# Patient Record
Sex: MALE | URBAN - METROPOLITAN AREA
[De-identification: names, ages, dates, MRNs, and addresses within clinical notes are randomized per-mention and may not be internally consistent; named-entity substitution may affect disease eponyms.]

---

## 2017-04-20 ENCOUNTER — HISTORICAL (OUTPATIENT)
Dept: ADMINISTRATIVE | Facility: HOSPITAL | Age: 67
End: 2017-04-20

## 2022-04-10 ENCOUNTER — HISTORICAL (OUTPATIENT)
Dept: ADMINISTRATIVE | Facility: HOSPITAL | Age: 72
End: 2022-04-10

## 2022-04-25 VITALS — HEIGHT: 70 IN | WEIGHT: 210 LBS | BODY MASS INDEX: 30.06 KG/M2

## 2025-03-15 ENCOUNTER — HOSPITAL ENCOUNTER (INPATIENT)
Facility: HOSPITAL | Age: 75
LOS: 2 days | Discharge: HOSPICE | DRG: 871 | End: 2025-03-17
Attending: STUDENT IN AN ORGANIZED HEALTH CARE EDUCATION/TRAINING PROGRAM | Admitting: INTERNAL MEDICINE
Payer: MEDICARE

## 2025-03-15 DIAGNOSIS — R05.9 COUGH: ICD-10-CM

## 2025-03-15 DIAGNOSIS — J18.9 PNEUMONIA OF RIGHT MIDDLE LOBE DUE TO INFECTIOUS ORGANISM: ICD-10-CM

## 2025-03-15 DIAGNOSIS — Z99.81 SUPPLEMENTAL OXYGEN DEPENDENT: ICD-10-CM

## 2025-03-15 DIAGNOSIS — Z78.9 ADMITTED TO INTENSIVE CARE UNIT: ICD-10-CM

## 2025-03-15 DIAGNOSIS — T68.XXXA HYPOTHERMIA, INITIAL ENCOUNTER: ICD-10-CM

## 2025-03-15 DIAGNOSIS — A41.9 SEPSIS, DUE TO UNSPECIFIED ORGANISM, UNSPECIFIED WHETHER ACUTE ORGAN DYSFUNCTION PRESENT: Primary | ICD-10-CM

## 2025-03-15 DIAGNOSIS — E16.2 HYPOGLYCEMIA: ICD-10-CM

## 2025-03-15 DIAGNOSIS — R41.82 AMS (ALTERED MENTAL STATUS): ICD-10-CM

## 2025-03-15 DIAGNOSIS — I95.9 HYPOTENSION: ICD-10-CM

## 2025-03-15 LAB
ACCEPTIBLE SP GR UR QL: 1.02 (ref 1–1.03)
ALBUMIN SERPL-MCNC: 2.9 G/DL (ref 3.4–4.8)
ALBUMIN/GLOB SERPL: 0.8 RATIO (ref 1.1–2)
ALP SERPL-CCNC: 147 UNIT/L (ref 40–150)
ALT SERPL-CCNC: 52 UNIT/L (ref 0–55)
AMPHET UR QL SCN: NEGATIVE
ANION GAP SERPL CALC-SCNC: 10 MEQ/L
APTT PPP: 34.3 SECONDS (ref 23.2–33.7)
AST SERPL-CCNC: 36 UNIT/L (ref 5–34)
BACTERIA #/AREA URNS AUTO: ABNORMAL /HPF
BARBITURATE SCN PRESENT UR: NEGATIVE
BENZODIAZ UR QL SCN: NEGATIVE
BILIRUB SERPL-MCNC: 0.4 MG/DL
BILIRUB UR QL STRIP.AUTO: NEGATIVE
BNP BLD-MCNC: 24.6 PG/ML
BUN SERPL-MCNC: 25.2 MG/DL (ref 8.4–25.7)
CALCIUM SERPL-MCNC: 10.1 MG/DL (ref 8.8–10)
CANNABINOIDS UR QL SCN: NEGATIVE
CHLORIDE SERPL-SCNC: 106 MMOL/L (ref 98–107)
CLARITY UR: CLEAR
CO2 SERPL-SCNC: 26 MMOL/L (ref 23–31)
COCAINE UR QL SCN: NEGATIVE
COLOR UR AUTO: ABNORMAL
CREAT SERPL-MCNC: 1.56 MG/DL (ref 0.72–1.25)
CREAT/UREA NIT SERPL: 16
ERYTHROCYTE [DISTWIDTH] IN BLOOD BY AUTOMATED COUNT: 16.6 % (ref 11.5–17)
ETHANOL SERPL-MCNC: <10 MG/DL
FENTANYL UR QL SCN: POSITIVE
FLUAV AG UPPER RESP QL IA.RAPID: NOT DETECTED
FLUBV AG UPPER RESP QL IA.RAPID: NOT DETECTED
GFR SERPLBLD CREATININE-BSD FMLA CKD-EPI: 46 ML/MIN/1.73/M2
GLOBULIN SER-MCNC: 3.6 GM/DL (ref 2.4–3.5)
GLUCOSE SERPL-MCNC: 76 MG/DL (ref 82–115)
GLUCOSE UR QL STRIP: NORMAL
HCT VFR BLD AUTO: 42.4 % (ref 42–52)
HGB BLD-MCNC: 14.7 G/DL (ref 14–18)
HGB UR QL STRIP: NEGATIVE
INR PPP: 1.1
KETONES UR QL STRIP: NEGATIVE
LACTATE SERPL-SCNC: 2 MMOL/L (ref 0.5–2.2)
LEUKOCYTE ESTERASE UR QL STRIP: NEGATIVE
MAGNESIUM SERPL-MCNC: 2 MG/DL (ref 1.6–2.6)
MCH RBC QN AUTO: 32.9 PG (ref 27–31)
MCHC RBC AUTO-ENTMCNC: 34.7 G/DL (ref 33–36)
MCV RBC AUTO: 94.9 FL (ref 80–94)
MDMA UR QL SCN: NEGATIVE
NITRITE UR QL STRIP: NEGATIVE
OPIATES UR QL SCN: NEGATIVE
PCP UR QL: NEGATIVE
PH UR STRIP: 5.5 [PH]
PH UR: 5.5 [PH] (ref 3–11)
PLATELET # BLD AUTO: 79 X10(3)/MCL (ref 130–400)
PLATELETS.RETICULATED NFR BLD AUTO: 17.3 % (ref 0.9–11.2)
PMV BLD AUTO: 13.4 FL (ref 7.4–10.4)
POCT GLUCOSE: 104 MG/DL (ref 70–110)
POCT GLUCOSE: 69 MG/DL (ref 70–110)
POCT GLUCOSE: 86 MG/DL (ref 70–110)
POTASSIUM SERPL-SCNC: 4.6 MMOL/L (ref 3.5–5.1)
PROT SERPL-MCNC: 6.5 GM/DL (ref 5.8–7.6)
PROT UR QL STRIP: ABNORMAL
PROTHROMBIN TIME: 13.8 SECONDS (ref 12.5–14.5)
RBC # BLD AUTO: 4.47 X10(6)/MCL (ref 4.7–6.1)
RBC #/AREA URNS AUTO: ABNORMAL /HPF
RSV A 5' UTR RNA NPH QL NAA+PROBE: NOT DETECTED
SARS-COV-2 RNA RESP QL NAA+PROBE: NOT DETECTED
SODIUM SERPL-SCNC: 142 MMOL/L (ref 136–145)
SP GR UR STRIP.AUTO: 1.02 (ref 1–1.03)
SQUAMOUS #/AREA URNS LPF: ABNORMAL /HPF
TROPONIN I SERPL-MCNC: <0.01 NG/ML (ref 0–0.04)
TSH SERPL-ACNC: 4.07 UIU/ML (ref 0.35–4.94)
UROBILINOGEN UR STRIP-ACNC: NORMAL
WBC # BLD AUTO: 15.68 X10(3)/MCL (ref 4.5–11.5)
WBC #/AREA URNS AUTO: ABNORMAL /HPF

## 2025-03-15 PROCEDURE — 87581 M.PNEUMON DNA AMP PROBE: CPT

## 2025-03-15 PROCEDURE — 85610 PROTHROMBIN TIME: CPT | Performed by: STUDENT IN AN ORGANIZED HEALTH CARE EDUCATION/TRAINING PROGRAM

## 2025-03-15 PROCEDURE — 85007 BL SMEAR W/DIFF WBC COUNT: CPT | Performed by: STUDENT IN AN ORGANIZED HEALTH CARE EDUCATION/TRAINING PROGRAM

## 2025-03-15 PROCEDURE — 81001 URINALYSIS AUTO W/SCOPE: CPT | Performed by: STUDENT IN AN ORGANIZED HEALTH CARE EDUCATION/TRAINING PROGRAM

## 2025-03-15 PROCEDURE — 96374 THER/PROPH/DIAG INJ IV PUSH: CPT | Mod: 59

## 2025-03-15 PROCEDURE — 87154 CUL TYP ID BLD PTHGN 6+ TRGT: CPT | Performed by: STUDENT IN AN ORGANIZED HEALTH CARE EDUCATION/TRAINING PROGRAM

## 2025-03-15 PROCEDURE — 80307 DRUG TEST PRSMV CHEM ANLYZR: CPT | Performed by: STUDENT IN AN ORGANIZED HEALTH CARE EDUCATION/TRAINING PROGRAM

## 2025-03-15 PROCEDURE — 11000001 HC ACUTE MED/SURG PRIVATE ROOM

## 2025-03-15 PROCEDURE — 85730 THROMBOPLASTIN TIME PARTIAL: CPT | Performed by: STUDENT IN AN ORGANIZED HEALTH CARE EDUCATION/TRAINING PROGRAM

## 2025-03-15 PROCEDURE — 5A0945A ASSISTANCE WITH RESPIRATORY VENTILATION, 24-96 CONSECUTIVE HOURS, HIGH NASAL FLOW/VELOCITY: ICD-10-PCS | Performed by: INTERNAL MEDICINE

## 2025-03-15 PROCEDURE — 93005 ELECTROCARDIOGRAM TRACING: CPT

## 2025-03-15 PROCEDURE — 84443 ASSAY THYROID STIM HORMONE: CPT | Performed by: STUDENT IN AN ORGANIZED HEALTH CARE EDUCATION/TRAINING PROGRAM

## 2025-03-15 PROCEDURE — 83880 ASSAY OF NATRIURETIC PEPTIDE: CPT | Performed by: STUDENT IN AN ORGANIZED HEALTH CARE EDUCATION/TRAINING PROGRAM

## 2025-03-15 PROCEDURE — 63600175 PHARM REV CODE 636 W HCPCS: Performed by: STUDENT IN AN ORGANIZED HEALTH CARE EDUCATION/TRAINING PROGRAM

## 2025-03-15 PROCEDURE — 99291 CRITICAL CARE FIRST HOUR: CPT

## 2025-03-15 PROCEDURE — 83735 ASSAY OF MAGNESIUM: CPT | Performed by: STUDENT IN AN ORGANIZED HEALTH CARE EDUCATION/TRAINING PROGRAM

## 2025-03-15 PROCEDURE — 25000003 PHARM REV CODE 250

## 2025-03-15 PROCEDURE — 85025 COMPLETE CBC W/AUTO DIFF WBC: CPT | Performed by: STUDENT IN AN ORGANIZED HEALTH CARE EDUCATION/TRAINING PROGRAM

## 2025-03-15 PROCEDURE — 80053 COMPREHEN METABOLIC PANEL: CPT | Performed by: STUDENT IN AN ORGANIZED HEALTH CARE EDUCATION/TRAINING PROGRAM

## 2025-03-15 PROCEDURE — 87186 SC STD MICRODIL/AGAR DIL: CPT

## 2025-03-15 PROCEDURE — 93010 ELECTROCARDIOGRAM REPORT: CPT | Mod: ,,, | Performed by: INTERNAL MEDICINE

## 2025-03-15 PROCEDURE — 83605 ASSAY OF LACTIC ACID: CPT | Performed by: STUDENT IN AN ORGANIZED HEALTH CARE EDUCATION/TRAINING PROGRAM

## 2025-03-15 PROCEDURE — 25000003 PHARM REV CODE 250: Performed by: STUDENT IN AN ORGANIZED HEALTH CARE EDUCATION/TRAINING PROGRAM

## 2025-03-15 PROCEDURE — 84484 ASSAY OF TROPONIN QUANT: CPT | Performed by: STUDENT IN AN ORGANIZED HEALTH CARE EDUCATION/TRAINING PROGRAM

## 2025-03-15 PROCEDURE — 0241U COVID/RSV/FLU A&B PCR: CPT | Performed by: STUDENT IN AN ORGANIZED HEALTH CARE EDUCATION/TRAINING PROGRAM

## 2025-03-15 PROCEDURE — 82962 GLUCOSE BLOOD TEST: CPT

## 2025-03-15 PROCEDURE — 87040 BLOOD CULTURE FOR BACTERIA: CPT | Performed by: STUDENT IN AN ORGANIZED HEALTH CARE EDUCATION/TRAINING PROGRAM

## 2025-03-15 PROCEDURE — 96365 THER/PROPH/DIAG IV INF INIT: CPT

## 2025-03-15 PROCEDURE — 27000207 HC ISOLATION

## 2025-03-15 PROCEDURE — 82077 ASSAY SPEC XCP UR&BREATH IA: CPT | Performed by: STUDENT IN AN ORGANIZED HEALTH CARE EDUCATION/TRAINING PROGRAM

## 2025-03-15 RX ORDER — SODIUM CHLORIDE 0.9 % (FLUSH) 0.9 %
10 SYRINGE (ML) INJECTION
Status: DISCONTINUED | OUTPATIENT
Start: 2025-03-16 | End: 2025-03-17 | Stop reason: HOSPADM

## 2025-03-15 RX ORDER — SODIUM CHLORIDE, SODIUM LACTATE, POTASSIUM CHLORIDE, CALCIUM CHLORIDE 600; 310; 30; 20 MG/100ML; MG/100ML; MG/100ML; MG/100ML
INJECTION, SOLUTION INTRAVENOUS CONTINUOUS
Status: DISCONTINUED | OUTPATIENT
Start: 2025-03-16 | End: 2025-03-17 | Stop reason: HOSPADM

## 2025-03-15 RX ORDER — NOREPINEPHRINE BITARTRATE/D5W 8 MG/250ML
0-3 PLASTIC BAG, INJECTION (ML) INTRAVENOUS CONTINUOUS
Status: DISCONTINUED | OUTPATIENT
Start: 2025-03-15 | End: 2025-03-16 | Stop reason: HOSPADM

## 2025-03-15 RX ORDER — CEFTRIAXONE 1 G/1
1 INJECTION, POWDER, FOR SOLUTION INTRAMUSCULAR; INTRAVENOUS
Status: DISCONTINUED | OUTPATIENT
Start: 2025-03-15 | End: 2025-03-15

## 2025-03-15 RX ORDER — ENOXAPARIN SODIUM 100 MG/ML
40 INJECTION SUBCUTANEOUS EVERY 24 HOURS
Status: DISCONTINUED | OUTPATIENT
Start: 2025-03-16 | End: 2025-03-17 | Stop reason: HOSPADM

## 2025-03-15 RX ORDER — NOREPINEPHRINE BITARTRATE/D5W 8 MG/250ML
PLASTIC BAG, INJECTION (ML) INTRAVENOUS
Status: COMPLETED
Start: 2025-03-15 | End: 2025-03-15

## 2025-03-15 RX ADMIN — SODIUM CHLORIDE 2052 ML: 9 INJECTION, SOLUTION INTRAVENOUS at 09:03

## 2025-03-15 RX ADMIN — Medication 0.02 MCG/KG/MIN: at 10:03

## 2025-03-15 RX ADMIN — AZITHROMYCIN MONOHYDRATE 500 MG: 500 INJECTION, POWDER, LYOPHILIZED, FOR SOLUTION INTRAVENOUS at 09:03

## 2025-03-15 RX ADMIN — NOREPINEPHRINE BITARTRATE 0.02 MCG/KG/MIN: 8 INJECTION, SOLUTION INTRAVENOUS at 10:03

## 2025-03-15 RX ADMIN — CEFTRIAXONE SODIUM 1 G: 1 INJECTION, POWDER, FOR SOLUTION INTRAMUSCULAR; INTRAVENOUS at 09:03

## 2025-03-15 NOTE — Clinical Note
Diagnosis: Sepsis, due to unspecified organism, unspecified whether acute organ dysfunction present [[id number]]   Future Attending Provider: FABRIZIO DELGADO [[id number]]   Admit to which facility:: [facility] [[id number]]   Reason for IP Medical Treatment  (Clinical interventions that can only be accomplished in the IP setting? ) :: PNA. IV ABX, Pressors

## 2025-03-16 LAB
ABS NEUT (OLG): 10.82 X10(3)/MCL (ref 2.1–9.2)
ALBUMIN SERPL-MCNC: 2.4 G/DL (ref 3.4–4.8)
ALBUMIN/GLOB SERPL: 0.6 RATIO (ref 1.1–2)
ALLENS TEST BLOOD GAS (OHS): YES
ALP SERPL-CCNC: 121 UNIT/L (ref 40–150)
ALT SERPL-CCNC: 42 UNIT/L (ref 0–55)
ANION GAP SERPL CALC-SCNC: 8 MEQ/L
AST SERPL-CCNC: 27 UNIT/L (ref 5–34)
B PERT.PT PRMT NPH QL NAA+NON-PROBE: NOT DETECTED
BASE EXCESS BLD CALC-SCNC: 2.1 MMOL/L
BILIRUB SERPL-MCNC: <0.5 MG/DL
BLOOD GAS SAMPLE TYPE (OHS): ABNORMAL
BUN SERPL-MCNC: 22.8 MG/DL (ref 8.4–25.7)
BURR CELLS (OLG): ABNORMAL
C PNEUM DNA NPH QL NAA+NON-PROBE: NOT DETECTED
CA-I BLD-SCNC: 1.29 MMOL/L (ref 1.12–1.23)
CALCIUM SERPL-MCNC: 9.6 MG/DL (ref 8.8–10)
CHLORIDE SERPL-SCNC: 110 MMOL/L (ref 98–107)
CO2 BLDA-SCNC: 28.7 MMOL/L
CO2 SERPL-SCNC: 21 MMOL/L (ref 23–31)
CREAT SERPL-MCNC: 1.51 MG/DL (ref 0.72–1.25)
CREAT/UREA NIT SERPL: 15
DRAWN BY BLOOD GAS (OHS): ABNORMAL
GFR SERPLBLD CREATININE-BSD FMLA CKD-EPI: 48 ML/MIN/1.73/M2
GLOBULIN SER-MCNC: 3.7 GM/DL (ref 2.4–3.5)
GLUCOSE SERPL-MCNC: 85 MG/DL (ref 82–115)
HADV DNA NPH QL NAA+NON-PROBE: NOT DETECTED
HCO3 BLDA-SCNC: 27.3 MMOL/L (ref 22–26)
HCOV 229E RNA NPH QL NAA+NON-PROBE: NOT DETECTED
HCOV HKU1 RNA NPH QL NAA+NON-PROBE: NOT DETECTED
HCOV NL63 RNA NPH QL NAA+NON-PROBE: NOT DETECTED
HCOV OC43 RNA NPH QL NAA+NON-PROBE: NOT DETECTED
HMPV RNA NPH QL NAA+NON-PROBE: NOT DETECTED
HPIV1 RNA NPH QL NAA+NON-PROBE: NOT DETECTED
HPIV2 RNA NPH QL NAA+NON-PROBE: NOT DETECTED
HPIV3 RNA NPH QL NAA+NON-PROBE: NOT DETECTED
HPIV4 RNA NPH QL NAA+NON-PROBE: NOT DETECTED
INSTRUMENT WBC (OLG): 15.68 X10(3)/MCL
LPM (OHS): 4
LYMPHOCYTES NFR BLD MANUAL: 19 %
LYMPHOCYTES NFR BLD MANUAL: 2.98 X10(3)/MCL (ref 0.6–4.6)
M PNEUMO DNA NPH QL NAA+NON-PROBE: NOT DETECTED
MACROCYTES BLD QL SMEAR: ABNORMAL
MAGNESIUM SERPL-MCNC: 1.7 MG/DL (ref 1.6–2.6)
METAMYELOCYTES NFR BLD MANUAL: 5 %
MONOCYTES NFR BLD MANUAL: 0.78 X10(3)/MCL (ref 0.1–1.3)
MONOCYTES NFR BLD MANUAL: 5 %
MYELOCYTES NFR BLD MANUAL: 3 %
NEUTROPHILS NFR BLD MANUAL: 69 %
OHS QRS DURATION: 88 MS
OHS QTC CALCULATION: 449 MS
OXYGEN DEVICE BLOOD GAS (OHS): ABNORMAL
PCO2 BLDA: 44 MMHG (ref 35–45)
PH BLDA: 7.4 [PH] (ref 7.35–7.45)
PHOSPHATE SERPL-MCNC: 2.2 MG/DL (ref 2.3–4.7)
PLATELET # BLD EST: ABNORMAL 10*3/UL
PO2 BLDA: 68 MMHG (ref 80–100)
POCT GLUCOSE: 82 MG/DL (ref 70–110)
POIKILOCYTOSIS BLD QL SMEAR: ABNORMAL
POTASSIUM BLOOD GAS (OHS): 4 MMOL/L (ref 3.5–5)
POTASSIUM SERPL-SCNC: 4.3 MMOL/L (ref 3.5–5.1)
PROT SERPL-MCNC: 6.1 GM/DL (ref 5.8–7.6)
RBC MORPH BLD: ABNORMAL
RSV RNA NPH QL NAA+NON-PROBE: NOT DETECTED
RV+EV RNA NPH QL NAA+NON-PROBE: NOT DETECTED
SAMPLE SITE BLOOD GAS (OHS): ABNORMAL
SAO2 % BLDA: 93 %
SODIUM BLOOD GAS (OHS): 137 MMOL/L (ref 137–145)
SODIUM SERPL-SCNC: 139 MMOL/L (ref 136–145)
TARGETS BLD QL SMEAR: ABNORMAL

## 2025-03-16 PROCEDURE — 27000221 HC OXYGEN, UP TO 24 HOURS

## 2025-03-16 PROCEDURE — 25000003 PHARM REV CODE 250

## 2025-03-16 PROCEDURE — 63600175 PHARM REV CODE 636 W HCPCS: Performed by: INTERNAL MEDICINE

## 2025-03-16 PROCEDURE — 99900031 HC PATIENT EDUCATION (STAT)

## 2025-03-16 PROCEDURE — 99900026 HC AIRWAY MAINTENANCE (STAT)

## 2025-03-16 PROCEDURE — 99900035 HC TECH TIME PER 15 MIN (STAT)

## 2025-03-16 PROCEDURE — 36415 COLL VENOUS BLD VENIPUNCTURE: CPT | Performed by: STUDENT IN AN ORGANIZED HEALTH CARE EDUCATION/TRAINING PROGRAM

## 2025-03-16 PROCEDURE — 63600175 PHARM REV CODE 636 W HCPCS

## 2025-03-16 PROCEDURE — 25000242 PHARM REV CODE 250 ALT 637 W/ HCPCS

## 2025-03-16 PROCEDURE — 31720 CLEARANCE OF AIRWAYS: CPT

## 2025-03-16 PROCEDURE — 80053 COMPREHEN METABOLIC PANEL: CPT

## 2025-03-16 PROCEDURE — 94640 AIRWAY INHALATION TREATMENT: CPT

## 2025-03-16 PROCEDURE — 25000003 PHARM REV CODE 250: Performed by: INTERNAL MEDICINE

## 2025-03-16 PROCEDURE — 83735 ASSAY OF MAGNESIUM: CPT

## 2025-03-16 PROCEDURE — 94760 N-INVAS EAR/PLS OXIMETRY 1: CPT

## 2025-03-16 PROCEDURE — 93005 ELECTROCARDIOGRAM TRACING: CPT

## 2025-03-16 PROCEDURE — 84100 ASSAY OF PHOSPHORUS: CPT

## 2025-03-16 PROCEDURE — 36415 COLL VENOUS BLD VENIPUNCTURE: CPT

## 2025-03-16 PROCEDURE — 21400001 HC TELEMETRY ROOM

## 2025-03-16 PROCEDURE — 27000207 HC ISOLATION

## 2025-03-16 PROCEDURE — 87040 BLOOD CULTURE FOR BACTERIA: CPT | Performed by: STUDENT IN AN ORGANIZED HEALTH CARE EDUCATION/TRAINING PROGRAM

## 2025-03-16 RX ORDER — MORPHINE SULFATE 4 MG/ML
1 INJECTION, SOLUTION INTRAMUSCULAR; INTRAVENOUS EVERY 4 HOURS PRN
Status: DISCONTINUED | OUTPATIENT
Start: 2025-03-16 | End: 2025-03-17 | Stop reason: HOSPADM

## 2025-03-16 RX ORDER — IPRATROPIUM BROMIDE AND ALBUTEROL SULFATE 2.5; .5 MG/3ML; MG/3ML
3 SOLUTION RESPIRATORY (INHALATION) EVERY 6 HOURS PRN
Status: DISCONTINUED | OUTPATIENT
Start: 2025-03-16 | End: 2025-03-17 | Stop reason: HOSPADM

## 2025-03-16 RX ORDER — ACETYLCYSTEINE 100 MG/ML
4 SOLUTION ORAL; RESPIRATORY (INHALATION)
Status: DISCONTINUED | OUTPATIENT
Start: 2025-03-16 | End: 2025-03-17 | Stop reason: HOSPADM

## 2025-03-16 RX ORDER — SCOPOLAMINE 1 MG/3D
1 PATCH, EXTENDED RELEASE TRANSDERMAL
Status: DISCONTINUED | OUTPATIENT
Start: 2025-03-16 | End: 2025-03-17 | Stop reason: HOSPADM

## 2025-03-16 RX ORDER — LORAZEPAM 2 MG/ML
1 INJECTION INTRAMUSCULAR
Status: DISCONTINUED | OUTPATIENT
Start: 2025-03-16 | End: 2025-03-16

## 2025-03-16 RX ORDER — MUPIROCIN 20 MG/G
OINTMENT TOPICAL 2 TIMES DAILY
Status: DISCONTINUED | OUTPATIENT
Start: 2025-03-16 | End: 2025-03-17 | Stop reason: HOSPADM

## 2025-03-16 RX ADMIN — PIPERACILLIN SODIUM AND TAZOBACTAM SODIUM 4.5 G: 4; .5 INJECTION, POWDER, LYOPHILIZED, FOR SOLUTION INTRAVENOUS at 02:03

## 2025-03-16 RX ADMIN — MUPIROCIN: 20 OINTMENT TOPICAL at 09:03

## 2025-03-16 RX ADMIN — IPRATROPIUM BROMIDE AND ALBUTEROL SULFATE 3 ML: .5; 3 SOLUTION RESPIRATORY (INHALATION) at 01:03

## 2025-03-16 RX ADMIN — ENOXAPARIN SODIUM 40 MG: 40 INJECTION SUBCUTANEOUS at 05:03

## 2025-03-16 RX ADMIN — ACETYLCYSTEINE 4 ML: 100 INHALANT RESPIRATORY (INHALATION) at 02:03

## 2025-03-16 RX ADMIN — VANCOMYCIN HYDROCHLORIDE 1500 MG: 1.5 INJECTION, POWDER, LYOPHILIZED, FOR SOLUTION INTRAVENOUS at 12:03

## 2025-03-16 RX ADMIN — PIPERACILLIN SODIUM AND TAZOBACTAM SODIUM 4.5 G: 4; .5 INJECTION, POWDER, LYOPHILIZED, FOR SOLUTION INTRAVENOUS at 10:03

## 2025-03-16 RX ADMIN — SCOPOLAMINE 1 PATCH: 1 PATCH TRANSDERMAL at 10:03

## 2025-03-16 RX ADMIN — IPRATROPIUM BROMIDE AND ALBUTEROL SULFATE 3 ML: .5; 3 SOLUTION RESPIRATORY (INHALATION) at 08:03

## 2025-03-16 RX ADMIN — SODIUM CHLORIDE, POTASSIUM CHLORIDE, SODIUM LACTATE AND CALCIUM CHLORIDE: 600; 310; 30; 20 INJECTION, SOLUTION INTRAVENOUS at 12:03

## 2025-03-16 RX ADMIN — AZITHROMYCIN MONOHYDRATE 500 MG: 500 INJECTION, POWDER, LYOPHILIZED, FOR SOLUTION INTRAVENOUS at 10:03

## 2025-03-16 RX ADMIN — MUPIROCIN: 20 OINTMENT TOPICAL at 10:03

## 2025-03-16 RX ADMIN — PIPERACILLIN SODIUM AND TAZOBACTAM SODIUM 4.5 G: 4; .5 INJECTION, POWDER, LYOPHILIZED, FOR SOLUTION INTRAVENOUS at 05:03

## 2025-03-16 RX ADMIN — SODIUM CHLORIDE, POTASSIUM CHLORIDE, SODIUM LACTATE AND CALCIUM CHLORIDE: 600; 310; 30; 20 INJECTION, SOLUTION INTRAVENOUS at 05:03

## 2025-03-16 RX ADMIN — ACETYLCYSTEINE 4 ML: 100 INHALANT RESPIRATORY (INHALATION) at 08:03

## 2025-03-16 RX ADMIN — VANCOMYCIN HYDROCHLORIDE 1250 MG: 1.25 INJECTION, POWDER, LYOPHILIZED, FOR SOLUTION INTRAVENOUS at 02:03

## 2025-03-16 NOTE — PLAN OF CARE
Problem: Infection  Goal: Absence of Infection Signs and Symptoms  Outcome: Progressing     Problem: Adult Inpatient Plan of Care  Goal: Plan of Care Review  Outcome: Progressing  Goal: Patient-Specific Goal (Individualized)  Outcome: Progressing  Goal: Absence of Hospital-Acquired Illness or Injury  Outcome: Progressing  Goal: Optimal Comfort and Wellbeing  Outcome: Progressing  Goal: Readiness for Transition of Care  Outcome: Progressing     Problem: Neutropenia  Goal: Absence of Infection  Outcome: Progressing     Problem: Skin Injury Risk Increased  Goal: Skin Health and Integrity  Outcome: Progressing      fluid retention

## 2025-03-16 NOTE — ED PROVIDER NOTES
Encounter Date: 3/15/2025    SCRIBE #1 NOTE: I, Sam Godinez, am scribing for, and in the presence of,  Bon Negrete MD. I have scribed the following portions of the note - Other sections scribed: HPI,ROS,PE.       History     Chief Complaint   Patient presents with    Altered Mental Status     Pt arrives via [facility], EMS reports family called due to altered mental status x several days, family noticed that he wasn't talking earlier tonight, Pt is speaking on arrival, slow to respond. No facial asymmetry, no unilateral weakness, weakness in all 4 ext. EMS reports CBG 64 they gave 10 g of glucose, CBG on arrival 104. Pt has wet sounding cough. Pt is a DNR.      73 y/o male with PMHx of dementia, HTN, and alcohol dependence presents to ED via EMS for altered mental status onset ~3x days. Wife at bedside reports pt has been having worsening altered mental status since onset. She reports pt isn't acting normally, she states he is not eating/drinking, not as active, and isn't talking like normal.  She also states pt has been having a wet cough for several days. She states she is currently trying to set the patient up for hospice, but keeps getting delayed.     History and ROS limited due to pt's history of dementia.     The history is provided by the spouse. History limited by: pt's history of dementia.     Review of patient's allergies indicates:  Not on File  No past medical history on file.  No past surgical history on file.  No family history on file.  Social History[1]  Review of Systems   Unable to perform ROS: Dementia       Physical Exam     Initial Vitals [03/15/25 2022]   BP Pulse Resp Temp SpO2   (!) 113/58 64 16 (S) (!) 95.6 °F (35.3 °C) 96 %      MAP       --         Physical Exam    Constitutional: He appears well-developed and well-nourished. He is not diaphoretic. No distress.   HENT:   Head: Normocephalic and atraumatic.   Right Ear: External ear normal.   Left Ear: External ear normal.   Nose: Nose  normal.   Eyes: EOM are normal. Pupils are equal, round, and reactive to light. Right eye exhibits no discharge. Left eye exhibits no discharge.   Cardiovascular:  Normal rate, regular rhythm and normal heart sounds.     Exam reveals no gallop and no friction rub.       No murmur heard.  Pulses:       Radial pulses are 2+ on the right side and 2+ on the left side.   Pulmonary/Chest: Effort normal. No respiratory distress. He has no wheezes. He has rhonchi (rhonchi to the bilateral lower lobes). He has rales (rales on the right). He exhibits no tenderness.   Wet cough.    Abdominal: Abdomen is soft. Bowel sounds are normal. He exhibits no distension and no mass. There is no abdominal tenderness. There is no rebound and no guarding.   Musculoskeletal:         General: No edema. Normal range of motion.     Neurological: He is alert. No cranial nerve deficit or sensory deficit.   Pt is confused.   Intermittently follows commands.   Delayed to respond.    Skin: Skin is warm and dry. Capillary refill takes less than 2 seconds.         ED Course   Critical Care    Date/Time: 3/15/2025 8:34 PM    Performed by: Bon Negrete MD  Authorized by: Bon Negrete MD  Direct patient critical care time: 9 minutes  Additional history critical care time: 7 minutes  Ordering / reviewing critical care time: 8 minutes  Documentation critical care time: 5 minutes  Consulting other physicians critical care time: 6 minutes  Total critical care time (exclusive of procedural time) : 35 minutes  Critical care time was exclusive of separately billable procedures and treating other patients.  Critical care was necessary to treat or prevent imminent or life-threatening deterioration of the following conditions: sepsis.  Critical care was time spent personally by me on the following activities: development of treatment plan with patient or surrogate, discussions with consultants, interpretation of cardiac output measurements, evaluation  of patient's response to treatment, obtaining history from patient or surrogate, examination of patient, ordering and performing treatments and interventions, ordering and review of laboratory studies, ordering and review of radiographic studies, pulse oximetry and re-evaluation of patient's condition.        Labs Reviewed   APTT - Abnormal       Result Value    PTT 34.3 (*)    COMPREHENSIVE METABOLIC PANEL - Abnormal    Sodium 142      Potassium 4.6      Chloride 106      CO2 26      Glucose 76 (*)     Blood Urea Nitrogen 25.2      Creatinine 1.56 (*)     Calcium 10.1 (*)     Protein Total 6.5      Albumin 2.9 (*)     Globulin 3.6 (*)     Albumin/Globulin Ratio 0.8 (*)     Bilirubin Total 0.4            ALT 52      AST 36 (*)     eGFR 46      Anion Gap 10.0      BUN/Creatinine Ratio 16     DRUG SCREEN, URINE (BEAKER) - Abnormal    Amphetamines, Urine Negative      Barbiturates, Urine Negative      Benzodiazepine, Urine Negative      Cannabinoids, Urine Negative      Cocaine, Urine Negative      Fentanyl, Urine Positive (*)     MDMA, Urine Negative      Opiates, Urine Negative      Phencyclidine, Urine Negative      pH, Urine 5.5      Specific Gravity, Urine Auto 1.020      Narrative:     Cut off concentrations:    Amphetamines - 1000 ng/ml  Barbiturates - 200 ng/ml  Benzodiazepine - 200 ng/ml  Cannabinoids (THC) - 50 ng/ml  Cocaine - 300 ng/ml  Fentanyl - 1.0 ng/ml  MDMA - 500 ng/ml  Opiates - 300 ng/ml   Phencyclidine (PCP) - 25 ng/ml    Specimen submitted for drug analysis and tested for pH and specific gravity in order to evaluate sample integrity. Suspect tampering if specific gravity is <1.003 and/or pH is not within the range of 4.5 - 8.0  False negatives may result form substances such as bleach added to urine.  False positives may result for the presence of a substance with similar chemical structure to the drug or its metabolite.    This test provides only a PRELIMINARY analytical test result. A  more specific alternate chemical method must be used in order to obtain a confirmed analytical result. Gas chromatography/mass spectrometry (GC/MS) is the preferred confirmatory method. Other chemical confirmation methods are available. Clinical consideration and professional judgement should be applied to any drug of abuse test result, particularly when preliminary positive results are used.    Positive results will be confirmed only at the physicians request. Unconfirmed screening results are to be used only for medical purposes (treatment).        URINALYSIS, REFLEX TO URINE CULTURE - Abnormal    Color, UA Light-Yellow      Appearance, UA Clear      Specific Gravity, UA 1.020      pH, UA 5.5      Protein, UA Trace (*)     Glucose, UA Normal      Ketones, UA Negative      Blood, UA Negative      Bilirubin, UA Negative      Urobilinogen, UA Normal      Nitrites, UA Negative      Leukocyte Esterase, UA Negative      RBC, UA 0-5      WBC, UA None Seen      Bacteria, UA None Seen      Squamous Epithelial Cells, UA None Seen     CBC WITH DIFFERENTIAL - Abnormal    WBC 15.68 (*)     RBC 4.47 (*)     Hgb 14.7      Hct 42.4      MCV 94.9 (*)     MCH 32.9 (*)     MCHC 34.7      RDW 16.6      Platelet 79 (*)     MPV 13.4 (*)     IPF 17.3 (*)    POCT GLUCOSE - Abnormal    POCT Glucose 69 (*)    B-TYPE NATRIURETIC PEPTIDE - Normal    Natriuretic Peptide 24.6     COVID/RSV/FLU A&B PCR - Normal    Influenza A PCR Not Detected      Influenza B PCR Not Detected      Respiratory Syncytial Virus PCR Not Detected      SARS-CoV-2 PCR Not Detected      Narrative:     The Xpert Xpress SARS-CoV-2/FLU/RSV plus is a rapid, multiplexed real-time PCR test intended for the simultaneous qualitative detection and differentiation of SARS-CoV-2, Influenza A, Influenza B, and respiratory syncytial virus (RSV) viral RNA in either nasopharyngeal swab or nasal swab specimens.         ALCOHOL,MEDICAL (ETHANOL) - Normal    Ethanol Level <10.0      LACTIC ACID, PLASMA - Normal    Lactic Acid Level 2.0     MAGNESIUM - Normal    Magnesium Level 2.00     PROTIME-INR - Normal    PT 13.8      INR 1.1      Narrative:     Protimes are used to monitor anticoagulant agents such as warfarin. PT INR values are based on the current patient normal mean and the TAJ value for the specific instrument reagent used.  **Routine theraputic target values for the INR are 2.0-3.0**   TROPONIN I - Normal    Troponin-I <0.010     TSH - Normal    TSH 4.066     BLOOD CULTURE [facility]   BLOOD CULTURE [facility]   RESPIRATORY CULTURE ([facility])   DRUG SCREEN, URINE (BEAKER)   MRSA PCR   POCT GLUCOSE    POCT Glucose 104     POCT GLUCOSE    POCT Glucose 86     POCT GLUCOSE MONITORING CONTINUOUS          Imaging Results              CT Chest Without Contrast (Preliminary result)  Result time 03/16/25 00:29:18      Preliminary result by Aleks Busch MD (03/16/25 00:29:18)                   Narrative:    START OF REPORT:  Technique: CT Scan of the chest was performed without intravenous contrast with direct axial as well as sagittal and, coronal, reconstruction images.    Dosage Information: Automated Exposure Control was utilized.    Comparison: None.    Clinical History: Pneumonia, unresolved; Aspiration.    Findings:  Neck: The visualized soft tissues of the neck appear unremarkable.  Mediastinum: The mediastinal structures are within normal limits.  Heart: The heart size is within normal limits. Minimal coronary artery calcification is seen.  Aorta: Moderate aortic calcification is seen in the thoracic aorta.  Pulmonary Arteries: No filling defects are seen in the pulmonary arteries to suggest pulmonary embolus to the sensitivity of the study.  Lungs: There are hazy and nodular opacities seen in both lower lobes. Focal consolidations are also seen in both lung bases. These are consistent with an acute infectious process such as multifocal atypical pneumonia.  Pleura: No effusions or pneumothorax are  identified.  Bony Structures:  Spine: Moderate spondylolytic changes are seen in the thoracic spine. Multilevel disc space narrowing is also noted.  Abdomen: Layering sludge is seen within the gallbladder. A cyst is noted in the upper pole of the left kidney measuring 2.5 cm.      Impression:  1. There are hazy and nodular opacities seen in both lower lobes. Focal consolidations are also seen in both lung bases. These are consistent with an acute infectious process such as multifocal atypical pneumonia. Correlate with clinical and laboratory findings as regards additional evaluation and follow-up.  2. Details and other findings as discussed above.                                         CT Head Without Contrast (Preliminary result)  Result time 03/15/25 21:26:50      Preliminary result by Jesus Oneill MD (03/15/25 21:26:50)                   Narrative:    START OF REPORT:  Technique: CT of the head was performed without intravenous contrast with axial as well as coronal and sagittal images.    Comparison: None.    Dosage Information: Automated exposure control was utilized.    Clinical history: Ams, weakness.    Findings:  Hemorrhage: No acute intracranial hemorrhage is seen.  CSF spaces: The ventricles, sulci and basal cisterns all appear mildly prominent consistent with global cerebral atrophy.  Brain parenchyma: Mild microvascular change is seen in portions of the periventricular and deep white matter tracts.  Cerebellum: Unremarkable.  Sella and skull base: The sella appears somewhat prominent and CSF filled. This could reflect primary sella. Correlate with clinical and laboratory findings as regards additional evaluation and follow up.  Intracranial calcifications: Incidental note is made of bilateral choroid plexus calcification. Incidental note is made of some pineal region calcification. Incidental note is made of some calcification of the falx.  Calvarium: No acute linear or depressed skull fracture is  seen.    Maxillofacial Structures:  Paranasal sinuses: There is some opacity in the right maxillary sinus right ethmoid air cells and bilateral frontal sinuses. This may reflect acute on chronic sinus disease. The rest of the paranasal sinuses appear clear.  Orbits: The orbits appear unremarkable.  Zygomatic arches: The zygomatic arches are intact and unremarkable.  Temporal bones and mastoids: The temporal bones and mastoids appear unremarkable.  TMJ: The mandibular condyles appear normally placed with respect to the mandibular fossa.      Impression:  1. There is some opacity in the right maxillary sinus right ethmoid air cells and bilateral frontal sinuses. This may reflect acute on chronic sinus disease. Correlate clinically.  2. No acute intracranial process identified. Details and findings as noted above.                                         X-Ray Chest AP Portable (In process)                      Medications   azithromycin (ZITHROMAX) 500 mg in 0.9% NaCl 250 mL IVPB (admixture device) (0 mg Intravenous Stopped 3/15/25 2245)   NORepinephrine 8 mg in dextrose 5% 250 mL infusion (0.08 mcg/kg/min × 77.1 kg Intravenous Verify Only 3/16/25 0137)   sodium chloride 0.9% flush 10 mL (has no administration in time range)   enoxaparin injection 40 mg (has no administration in time range)   vancomycin - pharmacy to dose (has no administration in time range)   piperacillin-tazobactam (ZOSYN) 4.5 g in D5W 100 mL IVPB (MB+) (4.5 g Intravenous New Bag 3/16/25 0209)   lactated ringers infusion ( Intravenous Verify Only 3/16/25 0137)   mupirocin 2 % ointment (has no administration in time range)   acetylcysteine 100 mg/ml (10%) solution 4 mL (has no administration in time range)   albuterol-ipratropium 2.5 mg-0.5 mg/3 mL nebulizer solution 3 mL (3 mLs Nebulization Given 3/16/25 0107)   sodium chloride 0.9% bolus 2,052 mL 2,052 mL (0 mLs Intravenous Stopped 3/15/25 2245)   vancomycin 1,500 mg in 0.9% NaCl 250 mL IVPB  "(admixture device) (0 mg Intravenous Stopped 3/16/25 0216)             Scribe Attestation:   Scribe #1: I performed the above scribed service and the documentation accurately describes the services I performed. I attest to the accuracy of the note.    Attending Attestation:           Physician Attestation for Scribe:  Physician Attestation Statement for Scribe #1: I, Bon Negrete MD, reviewed documentation, as scribed by Sam Godinez in my presence, and it is both accurate and complete.         Medical Decision Making  The differential diagnosis includes, but is not limited to, sepsis, UTI, pneumonia, worsening Alzheimer's     See ED course for MDM    Amount and/or Complexity of Data Reviewed  Independent Historian: spouse     Details: Wife at bedside reports pt has been having worsening altered mental status since onset. She reports pt isn't acting normally, she states he is not eating/drinking, not as active, and isn't talking like normal.  She also states pt has been having a wet cough for several days. She states she is currently trying to set the patient up for hospice, but keeps getting delayed.     Labs: ordered. Decision-making details documented in ED Course.  Radiology: ordered and independent interpretation performed. Decision-making details documented in ED Course.  ECG/medicine tests: ordered and independent interpretation performed. Decision-making details documented in ED Course.    Risk  Prescription drug management.  Decision regarding hospitalization.            ED Course as of 03/16/25 0314   Sat Mar 15, 2025   2032 Chart review reveals note from PCP 01/16/2025 history of hypertension dementia.  That has on Alzheimer's medication but unsure of name.  Evaluated by hospitalist in the past for "going downhill".  History of dementia, peripheral artery disease, alcohol dependence. [MM]   2033 POCT Glucose: 104 [MM]   2047 Discussed with wife and patient in room.  She reports in the past he wished " to be a DNR/DNI.  I did asked patient he confirmed this.  Albeit he does not appear to be somewhat confused but we will honor both him and his wife's wishes. [MM]   2054 EKG done at 8:51 p.m. shows sinus rhythm rate of 60 .  Normal axis.  No ST elevation or depression.  Wavering baseline somewhat limiting interpretation but I do believe this represents sinus rhythm at not atrial fibrillation. [MM]   2121 POCT Glucose: 86 [MM]   2121 X-Ray Chest AP Portable  Right perihilar infiltrates, infiltrates bilateral lower lobes [MM]   2128 CBC auto differential(!)  Leukocytosis, no anemia [MM]   2128 Clinically patient appears to have pneumonia, he has dropped his blood pressure since we have started rewarming this maybe due to his infection versus vasodilation from the Eli Hugger.  We will start antibiotics, sepsis fluid bolus. [MM]   2145 Magnesium : 2.00 [MM]   2145 Alcohol, Serum: <10.0 [MM]   2145 POCT Glucose: 86 [MM]   2145 Comprehensive metabolic panel(!)  When compared to labs from 2 years ago acute kidney injury.  Creatinine of 1.56 prior 1.  No significant electrolyte abnormalities. [MM]   2145 INR: 1.1 [MM]   2145 PTT(!): 34.3 [MM]   2248 Patient with a worsening glucose, blood pressure here in the emergency department.  We have started him on a D5 half-normal drip.  We have started him on Levophed due to worsening hypotension despite receiving 3 L of fluid here in the emergency department.  I do believe part of his hypotension is due to 3 warming which has caused some vasodilation.  Both clinically and on plain films he has pneumonia.  Started on antibiotics.  Will admit for further evaluation management have discussed with both wife, son.  They both state that patient would want to be a DNR.  We would not want CPR intubation.  This has been placed in the chart and I have passed this has onto the ICU/admitting team.  We will see about getting patient admitted to the ICU for further evaluation management  "with concern for sepsis. [MM]   2250 Re-evaluation patient does feel warmer to touch.  His blood pressure is still soft we are starting Levophed.  Hands are warm.  Still arousable but continues to be somewhat altered here. [MM]   2304 Patient has a patch on his left shoulder.  That has unlabeled, skin colored/tan in appearance.  That has has been removed.  Wife reports that has a clonidine patch. [MM]      ED Course User Index  [MM] Bon Negrete MD               Medical Decision Making:   Clinical Tests:   Sepsis Perfusion Assessment: "I attest a sepsis perfusion exam was performed within 6 hours of sepsis, severe sepsis, or septic shock presentation, following fluid resuscitation."             Clinical Impression:  Final diagnoses:  [R41.82] AMS (altered mental status)  [R05.9] Cough  [T68.XXXA] Hypothermia, initial encounter  [Z99.81] Supplemental oxygen dependent  [J18.9] Pneumonia of right middle lobe due to infectious organism  [A41.9] Sepsis, due to unspecified organism, unspecified whether acute organ dysfunction present (Primary)  [E16.2] Hypoglycemia          ED Disposition Condition    Admit Stable                    [1]         Bon Negrete MD  03/16/25 0314    "

## 2025-03-16 NOTE — PLAN OF CARE
Problem: Adult Inpatient Plan of Care  Goal: Plan of Care Review  Outcome: Progressing  Goal: Patient-Specific Goal (Individualized)  Outcome: Progressing  Goal: Absence of Hospital-Acquired Illness or Injury  Outcome: Progressing  Goal: Optimal Comfort and Wellbeing  Outcome: Progressing  Goal: Readiness for Transition of Care  Outcome: Progressing     Problem: Skin Injury Risk Increased  Goal: Skin Health and Integrity  Outcome: Progressing     Problem: Coping Ineffective  Goal: Effective Coping  Outcome: Progressing

## 2025-03-16 NOTE — H&P
[facility] - Emergency Dept  Pulmonary Critical Care Note    Patient Name: Wojciech Jaimes  MRN: [id number]  Admission Date: 3/15/2025  Hospital Length of Stay: 0 days  Code Status: DNR  Attending Provider: Cassy Corley MD  Primary Care Provider: No primary care provider on file.     Subjective:     HPI:   Wojciech Jaimes 74 y.o. male with pmhx of dementia, HRN, alcohol use disorder presented to ED via EMS for concerns of AMS per family for 3 days. Most of History obtained from wife and son present, history/ROS limited due to hx of dementia and AMS. Patient at baseline with waxing and waning confusion, reports worsening Ams for several days along with decreased activity, and oral intake. Productive cough for several days, denies sick contacts. Reports intermittent chills for several days at night.  Patient is currently DNR, in process of getting setup for hospice/palliative care. [facility]. Denies cardiac, respiratory, or renal disease history. Reports hx of back surgery, no other surgical hx.     In ED, patient was hypothermic on arrival with rectal temp of 93.5F, bradycardic 50s, elevated WBC 15.68 and hypotensive 79/54. Sepsis protocol started, patient received 30ml/kg(2L bolus of NS). Blood cultures x2, Chest x-ray with bilateral pneumonia, started Rocephin and Azithromycin. Lactic acid 2.0, Troponin <0.01, Covid/flu/rsv negative, UA noninfectious, UDS positive for fentanyl. CT head obtained for AMS with no acute intracranial process. ICU consulted for septic shock, PNA, and pressor requirement    Hospital Course/Significant events:  03/15/2025: admitted to ICU for septic shock    24 Hour Interval History:  See HPI above    No past medical history on file.    No past surgical history on file.    Social History[1]        No current outpatient medications    Review of patient's allergies indicates:  Not on File     Current Inpatient Medications   azithromycin  500 mg Intravenous  Q24H    [START ON 3/16/2025] enoxparin  40 mg Subcutaneous Daily    piperacillin-tazobactam (Zosyn) IV (PEDS and ADULTS) (extended infusion is not appropriate)  4.5 g Intravenous Q8H       Current Intravenous Infusions   NORepinephrine bitartrate-D5W  0-3 mcg/kg/min Intravenous Continuous 26 mL/hr at 03/15/25 2324 0.18 mcg/kg/min at 03/15/25 2324         Review of Systems   Reason unable to perform ROS: AMS and dementia.          Objective:       Intake/Output Summary (Last 24 hours) at 3/15/2025 2349  Last data filed at 3/15/2025 2200  Gross per 24 hour   Intake --   Output 350 ml   Net -350 ml         Vital Signs (Most Recent):  Temp: (S) (!) 93.5 °F (34.2 °C) (03/15/25 2052)  Pulse: 68 (03/15/25 2327)  Resp: 20 (03/15/25 2327)  BP: (!) 112/53 (03/15/25 2327)  SpO2: 97 % (03/15/25 2327)  Body mass index is 25.85 kg/m².  Weight: 77.1 kg (170 lb) Vital Signs (24h Range):  Temp:  [93.5 °F (34.2 °C)-95.6 °F (35.3 °C)] 93.5 °F (34.2 °C)  Pulse:  [51-68] 68  Resp:  [14-23] 20  SpO2:  [89 %-99 %] 97 %  BP: ()/(44-81) 112/53     Physical Exam  Vitals reviewed.   Constitutional:       General: He is not in acute distress.     Appearance: He is ill-appearing and toxic-appearing.      Comments: Arousable to pain and voice   HENT:      Head: Normocephalic and atraumatic.      Mouth/Throat:      Mouth: Mucous membranes are moist.      Comments: Audible upper airway sounds with copious mucus present  Eyes:      Pupils: Pupils are equal, round, and reactive to light.   Cardiovascular:      Rate and Rhythm: Regular rhythm. Bradycardia present.      Pulses: Normal pulses.      Heart sounds: Normal heart sounds.   Pulmonary:      Effort: Respiratory distress present.      Breath sounds: Wheezing (R>L) and rales (R>L) present.   Abdominal:      General: Abdomen is flat. Bowel sounds are normal. There is no distension.      Palpations: Abdomen is soft.      Tenderness: There is no abdominal tenderness.   Genitourinary:      "Comments: Deferred  Musculoskeletal:         General: No swelling.      Cervical back: Normal range of motion.      Right lower leg: No edema.      Left lower leg: No edema.   Lymphadenopathy:      Cervical: No cervical adenopathy.   Skin:     General: Skin is warm and dry.      Capillary Refill: Capillary refill takes less than 2 seconds.   Neurological:      Comments: AMS(baseline hx of dementia), patient arousable to pain and voice, unable to answer questions appropriately or follow commands           Lines/Drains/Airways       Drain  Duration                  Urethral Catheter 03/15/25 2158 Temperature probe 16 Fr. <1 day              Peripheral Intravenous Line  Duration                  Peripheral IV - Single Lumen 18 G Anterior;Right Forearm -- days         Peripheral IV - Single Lumen 03/15/25 2102 18 G Anterior;Left Forearm <1 day         Peripheral IV - Single Lumen 03/15/25 2230 18 G No Posterior;Right Forearm <1 day                    Significant Labs:    Lab Results   Component Value Date    WBC 15.68 (H) 03/15/2025    HGB 14.7 03/15/2025    HCT 42.4 03/15/2025    MCV 94.9 (H) 03/15/2025    PLT 79 (L) 03/15/2025           BMP  Lab Results   Component Value Date     03/15/2025    K 4.6 03/15/2025    CO2 26 03/15/2025    BUN 25.2 03/15/2025    CREATININE 1.56 (H) 03/15/2025    CALCIUM 10.1 (H) 03/15/2025    AGAP 10.0 03/15/2025    ESTGFRAFRICA 79 12/09/2022         ABG  No results for input(s): "PH", "PO2", "PCO2", "HCO3", "POCBASEDEF" in the last 168 hours.    Mechanical Ventilation Support:         Significant Imaging:  I have reviewed the pertinent imaging within the past 24 hours.    Chest X-ray 3/15: independent read, bialteral lung opacities present R>L, no cardiomegaly, no pleural effusions or pneumothroax present      START OF REPORT:  Technique: CT of the head was performed without intravenous contrast with axial as well as coronal and sagittal images.     Comparison: None.     Dosage " Information: Automated exposure control was utilized.     Clinical history: Ams, weakness.     Findings:  Hemorrhage: No acute intracranial hemorrhage is seen.  CSF spaces: The ventricles, sulci and basal cisterns all appear mildly prominent consistent with global cerebral atrophy.  Brain parenchyma: Mild microvascular change is seen in portions of the periventricular and deep white matter tracts.  Cerebellum: Unremarkable.  Sella and skull base: The sella appears somewhat prominent and CSF filled. This could reflect primary sella. Correlate with clinical and laboratory findings as regards additional evaluation and follow up.  Intracranial calcifications: Incidental note is made of bilateral choroid plexus calcification. Incidental note is made of some pineal region calcification. Incidental note is made of some calcification of the falx.  Calvarium: No acute linear or depressed skull fracture is seen.     Maxillofacial Structures:  Paranasal sinuses: There is some opacity in the right maxillary sinus right ethmoid air cells and bilateral frontal sinuses. This may reflect acute on chronic sinus disease. The rest of the paranasal sinuses appear clear.  Orbits: The orbits appear unremarkable.  Zygomatic arches: The zygomatic arches are intact and unremarkable.  Temporal bones and mastoids: The temporal bones and mastoids appear unremarkable.  TMJ: The mandibular condyles appear normally placed with respect to the mandibular fossa.        Impression:  1. There is some opacity in the right maxillary sinus right ethmoid air cells and bilateral frontal sinuses. This may reflect acute on chronic sinus disease. Correlate clinically.  2. No acute intracranial process identified. Details and findings as noted above.        This result has not been signed. Information might be incomplete.  Exam Ended: 03/15/25 21:26 CDT Last Resulted: 03/15/25 21:26 CDT    Assessment/Plan:     Assessment  Septic Shock, likely due to bilateral  PNA with possible aspiration  Hypothermia  SIRS 2/4 ( WBC 14, hypothermia 95.6F(35.3C)  Hypoglycemia   AMS, baseline of dementia, worsened in past several days      Plan  SIRS 2/4, source of infection of B/L PNA on x-ray, given rocephin and azithromycin in ED. Due to profound hypotension and hypothermia, will transition to broad spectrum Abx coverage with vancomycin, zosyn, and azithromycin, de-escalate abx based on culture results  Blood culture x2, ordered respiratory panel and culture, MRSA PCR  Will need CT chest in AM  Lactic acid 2.0, troponin <0.01, Covid/flu/RSV negative, UA noninfectious, positive UDS for fentanyl but no fentanyl prescribed per wife, will repeat UDS  Requiring levophed, keep MAP >65  Hypothermia precautions, continue external warming  Maintain O2 sat >92%, pending ABG  Hypoglycemia precautions, POCT glucose  AMS, worsening could be due to sepsis, continue to monitor, baseline dementia    DVT Prophylaxis: Lovenox  GI Prophylaxis: none  HOB elevated >30*     32 minutes of critical care was time spent personally by me on the following activities: development of treatment plan with patient or surrogate and bedside caregivers, discussions with consultants, evaluation of patient's response to treatment, examination of patient, ordering and performing treatments and interventions, ordering and review of laboratory studies, ordering and review of radiographic studies, pulse oximetry, re-evaluation of patient's condition.  This critical care time did not overlap with that of any other provider or involve time for any procedures.     Enrico Salazar MD  Pulmonary Critical Care Medicine  [facility] - Emergency Dept  DOS: 03/15/2025          [1]   Social History  Socioeconomic History    Marital status:

## 2025-03-16 NOTE — H&P
[facility] Hospital Medicine History & Physical Examination       Patient Name: Wojciech Jaimes  MRN: [id number]  Patient Class: IP- Inpatient   Admission Date: 3/15/2025   Admitting Physician: [facility]   Length of Stay: 1  Attending Physician: Dr Peterson Darden  Primary Care Provider: non-staff  Face-to-Face encounter date: 03/16/2025  Code Status: DNR  Chief Complaint: Altered Mental Status (Pt arrives via [facility], EMS reports family called due to altered mental status x several days, family noticed that he wasn't talking earlier tonight, Pt is speaking on arrival, slow to respond. No facial asymmetry, no unilateral weakness, weakness in all 4 ext. EMS reports CBG 64 they gave 10 g of glucose, CBG on arrival 104. Pt has wet sounding cough. Pt is a DNR. )        Screening for Social Drivers for health:  Patient screened for food insecurity, housing instability, transportation needs, utility difficulties, and interpersonal safety (select all that apply as identified as concern)  []Housing or Food  []Transportation Needs  []Utility Difficulties  []Interpersonal safety  [x]None    Patient information was obtained from patient, patient's family, past medical records and/or ER records.     HISTORY OF PRESENT ILLNESS:   Wojciech Jaimes is a 74 y.o. male who PMH includes dementia,  HTN, ETOH abuse HLD   Tobacco use, tobacco use , Anxiety, Depression; presents to the ED at   ; presents to the ED at [facility] on 3/15/2025 with a primary complaint of AMS and confusion per family reports.  Family noticed patient was in talking and interacting with them at his baseline which they called EMS for ED transport.  Patient's blood glucose was noted to be 64 with EMS which he was given 10 g of glucose with capillary blood glucose 104 on arrival in the ED. patient also had a known cough with wet sounding breath sounds.  Imaging consistent with suspicious of pneumonia.  Patient also was hypotensive which he received IV  hydration which provided little relief.  Patient was started on vasopressor therapy with Levophed and admitted to the ICU unit.  Patient was started on broad-spectrum IV antibiotic therapy.  Patient has since been weaned off of Levophed therapy and blood pressures have been marginal which he refused to have Levophed resume.  Patient is a DNR status.  It is reported that they were in the process of setting up hospice services at home prior to admission this hospitalization.  Patient has been cleared for transfer out ICU to the regular floor.  Hospital medicine services have been consulted for assumption of care.    PAST MEDICAL HISTORY:   Dementia  HTN  ETOH abuse  Parotid gland cancer   YESY GOMEZ   PAWEL with CPAP   Tobacco abuse    PAST SURGICAL HISTORY:   Endoscopy    ALLERGIES:   Patient has no allergy information on record.    FAMILY HISTORY:   Reviewed and negative    SOCIAL HISTORY:   Tobacco use   No reports of illicit drug use   History ETOH abuse   Lives with family     HOME MEDICATIONS:   As documented:   Cinnamon bark supplement 2000 mg p.o. daily   Catapres TTS patch 0.1 mg once weekly   Aricept 10 mg p.o. q.h.s.   Duloxetine 30 mg p.o. q.a.m.   Folic acid  MVI   Namenda 10 mg p.o. q.h.s.   Pravastatin 40 mg p.o. q.h.s.   Thiamine supplement    REVIEW OF SYSTEMS:   Except as documented, all other systems reviewed and negative     PHYSICAL EXAM:     VITAL SIGNS: 24 HRS MIN & MAX LAST   Temp  Min: 93.5 °F (34.2 °C)  Max: 97.9 °F (36.6 °C) 97.6 °F (36.4 °C)   BP  Min: 61/49  Max: 127/64 (!) 109/54   Pulse  Min: 51  Max: 83  82   Resp  Min: 13  Max: 23 17   SpO2  Min: 89 %  Max: 99 % 97 %       General appearance:  Chronically ill-appearing elderly male confused, nontoxic fatigued.  HENT: Atraumatic head.dry mucous membranes of oral cavity.  Eyes: PERRL  Lungs:  Diminished bilaterally coarse breath sounds scattered crackles and wheezing mildly labored respirations O2 saturation stable on supplemental oxygen therapy    Heart: Regular rate and rhythm. S1 and S2 present capillary refill brisk  Abdomen: Soft, non-distended, non-tender. No rebound tenderness/guarding. Bowel sounds are normal.   Extremities: No cyanosis, clubbing, generalized weakness  Skin:  Warm and dry.   Neuro:  Oriented to self, intermittent confusion noted no acute focal deficits  Psych/mental status:  Flat affect, cooperative    LABS AND IMAGING:     Recent Labs   Lab 03/15/25  2059   WBC 15.68  15.68*   RBC 4.47*   HGB 14.7   HCT 42.4   MCV 94.9*   MCH 32.9*   MCHC 34.7   RDW 16.6   PLT 79*   MPV 13.4*       Recent Labs   Lab 03/15/25  2059 03/16/25  0050 03/16/25  0409     --  139   K 4.6  --  4.3     --  110*   CO2 26  --  21*   BUN 25.2  --  22.8   CREATININE 1.56*  --  1.51*   CALCIUM 10.1*  --  9.6   PH  --  7.400  --    MG 2.00  --  1.70   ALBUMIN 2.9*  --  2.4*   ALKPHOS 147  --  121   ALT 52  --  42   AST 36*  --  27   BILITOT 0.4  --  <0.5       Microbiology Results (last 7 days)       Procedure Component Value Units Date/Time    Respiratory Culture [[id number]]     Order Status: Sent Specimen: Sputum     Blood culture #1 **CANNOT BE ORDERED STAT** [[id number]] Collected: 03/16/25 0043    Order Status: Resulted Specimen: Blood from Arm, Left Updated: 03/16/25 0128    Respiratory Culture [[id number]] Collected: 03/15/25 2344    Order Status: Resulted Specimen: Sputum, Expectorated Updated: 03/15/25 2350    Blood culture #2 **CANNOT BE ORDERED STAT** [[id number]] Collected: 03/15/25 2113    Order Status: Resulted Specimen: Blood from Hand, Left Updated: 03/15/25 2129             X-Ray Chest AP Portable  Narrative: EXAMINATION:  XR CHEST AP PORTABLE    CLINICAL HISTORY:  Cough, unspecified    COMPARISON:  No priors    FINDINGS:  Frontal view of the chest was obtained. Heart is not enlarged.  Mild interstitial prominence.  No dense consolidation or pneumothorax.  Impression: Mild interstitial prominence, question pulmonary vascular  congestion or infection.    Electronically signed by: Rome Nunez  Date:    03/16/2025  Time:    11:36  CT Chest Without Contrast  Narrative: EXAMINATION:  CT CHEST WITHOUT CONTRAST    CLINICAL HISTORY:  Sepsis.Pneumonia, unresolved;Aspiration;    TECHNIQUE:  Helical acquisition through the chest performed without IV contrast. Three plane reconstructions made available for review.  mGycm. Automatic exposure control, adjustment of mA/kV or iterative reconstruction technique was used to reduce radiation.    COMPARISON:  No priors    FINDINGS:  No significantly enlarged thoracic lymph nodes.    Heart is not enlarged.  There are coronary artery calcifications.    No pleural effusion.  There are patchy opacities in both lungs favoring the lower lobes.  There is bronchial wall thickening.    No acute findings imaged upper abdomen.  No acute osseous findings or  Impression: Patchy bilateral opacities favoring the lower lobes likely infectious or inflammatory.  Recommend follow-up to resolution.    No significant discrepancy with the preliminary report.    Electronically signed by: Rome Nunez  Date:    03/16/2025  Time:    10:34  CT Head Without Contrast  Narrative: EXAMINATION:  CT HEAD WITHOUT CONTRAST    CLINICAL HISTORY:  Mental status change, unknown cause;    TECHNIQUE:  Axial scans were obtained from skull base to the vertex.    Coronal and sagittal reconstructions obtained from the axial data.    Automatic exposure control was utilized to limit radiation dose.    Contrast: None    Radiation Dose:    Total DLP: 1082 mGy*cm    COMPARISON:  None    FINDINGS:  There is no acute intracranial hemorrhage or edema. The gray-white matter differentiation is preserved.  Patchy hypodensities in the subcortical periventricular white matter likely represent chronic microvascular ischemic changes.    There is no mass effect or midline shift.  There is diffuse parenchymal volume loss.  The basal cisterns are patent. There  is no abnormal extra-axial fluid collection.  Carotid artery calcifications are noted.    The calvarium and skull base are intact.  There is partial opacification of the paranasal sinuses.  Impression: 1. No acute intracranial abnormality.  2. Chronic microvascular ischemic changes  No significant change from the [facility] interpretation.    Electronically signed by: Donita Cagle  Date:    03/16/2025  Time:    08:42        ASSESSMENT & PLAN:   ASSESSMENT:  Acute encephalopathy-metabolic secondary to sepsis, hypothermia and pneumonia-POA   Septic shock requiring vasopressor therapy,secondary to pneumonia and respiratory failure-POA   Acute respiratory failure with hypoxia-requiring supplemental oxygen therapy-POA   Hypothermia-suspected secondary to sepsis-POA   Hypoglycemia-suspect secondary to sepsis-POA   Cough- POA  Weaknesses- POA    PLAN:  Patient has been weaned off of vasopressor therapy   Continue with supplemental oxygen therapy wean as tolerated   Patient is DNR status   Continue with IV antibiotic therapy for sepsis and pneumonia   Follow cultures and sensitivities   Continue with IV hydration   PT OT eval and treat   Case management to assist with discharge planning as patient/family are desiring to go home with hospice services   Fall precautions   Repeat lab work in a.m.   Comfort measures    VTE Prophylaxis: Lovenox for DVT prophylaxis and will be advised to be as mobile as possible and sit in a chair as tolerated    Patient condition:  Stable    __________________________________________________________________________  INPATIENT LIST OF MEDICATIONS     Scheduled Meds:   acetylcysteine 100 mg/ml (10%)  4 mL Nebulization TID WAKE    azithromycin  500 mg Intravenous Q24H    enoxparin  40 mg Subcutaneous Daily    mupirocin   Nasal BID    piperacillin-tazobactam (Zosyn) IV (PEDS and ADULTS) (extended infusion is not appropriate)  4.5 g Intravenous Q8H    vancomycin 1,250 mg in 0.9% NaCl 250 mL IVPB  (admixture device)  1,250 mg Intravenous Q24H     Continuous Infusions:   lactated ringers   Intravenous Continuous 75 mL/hr at 03/16/25 1116 Rate Verify at 03/16/25 1116    NORepinephrine bitartrate-D5W  0-3 mcg/kg/min Intravenous Continuous   Stopped at 03/16/25 1004     PRN Meds:.  Current Facility-Administered Medications:     albuterol-ipratropium, 3 mL, Nebulization, Q6H PRN    sodium chloride 0.9%, 10 mL, Intravenous, PRN    Pharmacy to dose Vancomycin consult, , , Once **AND** vancomycin - pharmacy to dose, , Intravenous, pharmacy to manage frequency      I, JUSTIN Fraser have reviewed and discussed the case with   Dr. Peterson Darden.  Please see the following addendum for further assessment and plan from their attending MD.This note was created with a assistance of electronic voice recognition software.  There may be transcription errors as a result of using this technology however minimal; and effort has been made to assure accuracy of the transcription but any obvious areas or admissions should be clarified with the author of the document     JUSTIN Jung   03/16/2025    ________________________________________________________________________________    MD Addendum:  I, Dr. Peterson darden assumed care of this patient today at ---am/pm  For the patient encounter, I performed the substantive portion of the visit, I reviewed the NP/PA documentation, treatment plan, and medical decision making.  I had face to face time with this patient     A. History:Wojciech Jaimes is a 74 y.o. male who PMH includes dementia,  HTN, ETOH abuse HLD   Tobacco use, tobacco use , Anxiety, Depression; presents to the ED at   ; presents to the ED at [facility] on 3/15/2025 with a primary complaint of AMS and confusion per family reports.  Family noticed patient was in talking and interacting with them at his baseline which they called EMS for ED transport.  Patient's blood glucose was noted to be 64 with EMS which he  was given 10 g of glucose with capillary blood glucose 104 on arrival in the ED. patient also had a known cough with wet sounding breath sounds.  Imaging consistent with suspicious of pneumonia.  Patient also was hypotensive which he received IV hydration which provided little relief.  Patient was started on vasopressor therapy with Levophed and admitted to the ICU unit.  Patient was started on broad-spectrum IV antibiotic therapy.  Patient has since been weaned off of Levophed therapy and blood pressures have been marginal which he refused to have Levophed resume.  Patient is a DNR status.    B. Physical exam:  Resting comfortably alert oriented times 1  Respirations diminished cardiovascular regular rate rhythm abdominal soft extremities no cyanosis clubbing or edema neuro can not be assessed    C. Medical decision making:  I will be putting morphine Ativan scopolamine patch consulting hospice further expert opinion and management    Discharge Planning and Disposition: No mobility needs. Ambulating well. Good social support system.   Anticipated discharge    All diagnosis and differential diagnosis have been reviewed; assessment and plan has been documented; I have personally reviewed the labs and test results that are presently available; I have reviewed the patients medication list; I have reviewed the consulting providers response and recommendations. I have reviewed or attempted to review medical records based upon their availability.    All of the patient and family questions have been addressed and answered. Patient's is agreeable to the above stated plan. I will continue to monitor closely and make adjustments to medical management as needed.

## 2025-03-17 VITALS
TEMPERATURE: 99 F | BODY MASS INDEX: 25.76 KG/M2 | OXYGEN SATURATION: 95 % | HEART RATE: 78 BPM | SYSTOLIC BLOOD PRESSURE: 104 MMHG | RESPIRATION RATE: 20 BRPM | WEIGHT: 170 LBS | DIASTOLIC BLOOD PRESSURE: 59 MMHG | HEIGHT: 68 IN

## 2025-03-17 PROBLEM — G93.41 ENCEPHALOPATHY, METABOLIC: Status: ACTIVE | Noted: 2025-03-17

## 2025-03-17 LAB
ACB COMPLEX DNA BLD POS QL NAA+NON-PROBE: NOT DETECTED
ALBUMIN SERPL-MCNC: 2.1 G/DL (ref 3.4–4.8)
ALBUMIN/GLOB SERPL: 0.7 RATIO (ref 1.1–2)
ALP SERPL-CCNC: 122 UNIT/L (ref 40–150)
ALT SERPL-CCNC: 39 UNIT/L (ref 0–55)
ANION GAP SERPL CALC-SCNC: 6 MEQ/L
AST SERPL-CCNC: 38 UNIT/L (ref 5–34)
B FRAGILIS DNA BLD POS QL NAA+PROBE: NOT DETECTED
BILIRUB SERPL-MCNC: 0.8 MG/DL
BUN SERPL-MCNC: 20.3 MG/DL (ref 8.4–25.7)
C ALBICANS DNA BLD POS QL NAA+PROBE: NOT DETECTED
C AURIS DNA BLD POS QL NAA+NON-PROBE: NOT DETECTED
C GATTII+NEOFOR DNA CSF QL NAA+NON-PROBE: NOT DETECTED
C GLABRATA DNA BLD POS QL NAA+PROBE: NOT DETECTED
C KRUSEI DNA BLD POS QL NAA+PROBE: NOT DETECTED
C PARAP DNA BLD POS QL NAA+PROBE: NOT DETECTED
C TROPICLS DNA BLD POS QL NAA+PROBE: NOT DETECTED
CALCIUM SERPL-MCNC: 9.2 MG/DL (ref 8.8–10)
CHLORIDE SERPL-SCNC: 110 MMOL/L (ref 98–107)
CO2 SERPL-SCNC: 21 MMOL/L (ref 23–31)
COLISTIN RES MCR-1 ISLT/SPM QL: ABNORMAL
CREAT SERPL-MCNC: 1.49 MG/DL (ref 0.72–1.25)
CREAT/UREA NIT SERPL: 14
E CLOAC COMP DNA BLD POS QL NAA+PROBE: NOT DETECTED
E COLI DNA BLD POS QL NAA+PROBE: NOT DETECTED
E FAECALIS+OTHR E SP RRNA BLD POS FISH: NOT DETECTED
E FAECIUM HSP60 BLD POS QL PROBE: NOT DETECTED
ENTEROBACTERALES DNA BLD POS NAA+N-PRB: NOT DETECTED
ESBL CFT TO CFT-CLAV IC RTO BD POS IMP: ABNORMAL
GFR SERPLBLD CREATININE-BSD FMLA CKD-EPI: 48 ML/MIN/1.73/M2
GLOBULIN SER-MCNC: 2.9 GM/DL (ref 2.4–3.5)
GLUCOSE SERPL-MCNC: 60 MG/DL (ref 82–115)
GP B STREP DNA CSF QL NAA+NON-PROBE: NOT DETECTED
HAEM INFLU DNA CSF QL NAA+NON-PROBE: NOT DETECTED
IMP CARBAPENEMASE ISLT QL IA.RAPID: ABNORMAL
K OXYTOCA OMPA BLD POS QL PROBE: NOT DETECTED
KLEBSIELLA SP DNA BLD POS QL NAA+NON-PRB: NOT DETECTED
KLEBSIELLA SP DNA BLD POS QL NAA+NON-PRB: NOT DETECTED
KPC CARBAPENEMASE ISLT QL IA.RAPID: ABNORMAL
L MONOCYTOG DNA CSF QL NAA+NON-PROBE: NOT DETECTED
MAGNESIUM SERPL-MCNC: 1.8 MG/DL (ref 1.6–2.6)
MECA+MECC NOSE QL NAA+PROBE: ABNORMAL
MECA+MECC+MREJ ISLT/SPM QL: ABNORMAL
N MEN DNA CSF QL NAA+NON-PROBE: NOT DETECTED
NDM CARBAPENEMASE ISLT QL IA.RAPID: ABNORMAL
OXA-48-LIKE CRBPNASE ISLT QL IA.RAPID: ABNORMAL
P AERUGINOSA DNA BLD POS QL NAA+PROBE: NOT DETECTED
PHOSPHATE SERPL-MCNC: 2.8 MG/DL (ref 2.3–4.7)
POTASSIUM SERPL-SCNC: 4.6 MMOL/L (ref 3.5–5.1)
PROT SERPL-MCNC: 5 GM/DL (ref 5.8–7.6)
PROTEUS SP DNA BLD POS QL NAA+PROBE: NOT DETECTED
S AUREUS DNA BLD POS QL NAA+PROBE: NOT DETECTED
S ENT+BONG DNA STL QL NAA+NON-PROBE: NOT DETECTED
S EPIDERMIDIS HSP60 BLD POS QL PROBE: NOT DETECTED
S LUGDUNENSIS SODA BLD POS QL PROBE: NOT DETECTED
S MALTOPH DNA BLD POS QL NAA+PROBE: NOT DETECTED
S MARCESCENS DNA BLD POS QL NAA+PROBE: NOT DETECTED
S PNEUM DNA CSF QL NAA+NON-PROBE: NOT DETECTED
S PYOGENES HSP60 BLD POS QL PROBE: NOT DETECTED
SODIUM SERPL-SCNC: 137 MMOL/L (ref 136–145)
STAPH SP TUF BLD POS QL PROBE: DETECTED
STREPTOCOCCUS SP TUF BLD POS QL PROBE: NOT DETECTED
VAN(A+B+C1+C2) GENES ISLT/SPM: ABNORMAL
VIM CARBAPENEMASE ISLT QL IA.RAPID: ABNORMAL

## 2025-03-17 PROCEDURE — 94760 N-INVAS EAR/PLS OXIMETRY 1: CPT

## 2025-03-17 PROCEDURE — 80053 COMPREHEN METABOLIC PANEL: CPT

## 2025-03-17 PROCEDURE — 27100171 HC OXYGEN HIGH FLOW UP TO 24 HOURS

## 2025-03-17 PROCEDURE — 63600175 PHARM REV CODE 636 W HCPCS: Performed by: INTERNAL MEDICINE

## 2025-03-17 PROCEDURE — 94640 AIRWAY INHALATION TREATMENT: CPT

## 2025-03-17 PROCEDURE — 63600175 PHARM REV CODE 636 W HCPCS

## 2025-03-17 PROCEDURE — 99223 1ST HOSP IP/OBS HIGH 75: CPT | Mod: ,,, | Performed by: INTERNAL MEDICINE

## 2025-03-17 PROCEDURE — 27000221 HC OXYGEN, UP TO 24 HOURS

## 2025-03-17 PROCEDURE — 25000003 PHARM REV CODE 250: Performed by: INTERNAL MEDICINE

## 2025-03-17 PROCEDURE — 94761 N-INVAS EAR/PLS OXIMETRY MLT: CPT

## 2025-03-17 PROCEDURE — 25000242 PHARM REV CODE 250 ALT 637 W/ HCPCS

## 2025-03-17 PROCEDURE — 83735 ASSAY OF MAGNESIUM: CPT

## 2025-03-17 PROCEDURE — 84100 ASSAY OF PHOSPHORUS: CPT

## 2025-03-17 PROCEDURE — 99900031 HC PATIENT EDUCATION (STAT)

## 2025-03-17 PROCEDURE — 25000003 PHARM REV CODE 250

## 2025-03-17 PROCEDURE — 99900035 HC TECH TIME PER 15 MIN (STAT)

## 2025-03-17 PROCEDURE — 36415 COLL VENOUS BLD VENIPUNCTURE: CPT

## 2025-03-17 RX ORDER — CLONIDINE 0.1 MG/24H
1 PATCH, EXTENDED RELEASE TRANSDERMAL
COMMUNITY

## 2025-03-17 RX ORDER — ZOLPIDEM TARTRATE 10 MG/1
5 TABLET ORAL NIGHTLY
COMMUNITY

## 2025-03-17 RX ORDER — PRAVASTATIN SODIUM 80 MG/1
40 TABLET ORAL NIGHTLY
COMMUNITY

## 2025-03-17 RX ORDER — LANOLIN ALCOHOL/MO/W.PET/CERES
100 CREAM (GRAM) TOPICAL NIGHTLY
COMMUNITY

## 2025-03-17 RX ORDER — ESCITALOPRAM OXALATE 10 MG/1
10 TABLET ORAL DAILY
COMMUNITY

## 2025-03-17 RX ORDER — MEMANTINE HYDROCHLORIDE 10 MG/1
10 TABLET ORAL 2 TIMES DAILY
COMMUNITY

## 2025-03-17 RX ORDER — DONEPEZIL HYDROCHLORIDE 10 MG/1
10 TABLET, FILM COATED ORAL NIGHTLY
COMMUNITY

## 2025-03-17 RX ORDER — AMLODIPINE BESYLATE 10 MG/1
10 TABLET ORAL DAILY
COMMUNITY

## 2025-03-17 RX ADMIN — ACETYLCYSTEINE 4 ML: 100 INHALANT RESPIRATORY (INHALATION) at 07:03

## 2025-03-17 RX ADMIN — PIPERACILLIN SODIUM AND TAZOBACTAM SODIUM 4.5 G: 4; .5 INJECTION, POWDER, LYOPHILIZED, FOR SOLUTION INTRAVENOUS at 09:03

## 2025-03-17 RX ADMIN — PIPERACILLIN SODIUM AND TAZOBACTAM SODIUM 4.5 G: 4; .5 INJECTION, POWDER, LYOPHILIZED, FOR SOLUTION INTRAVENOUS at 02:03

## 2025-03-17 RX ADMIN — IPRATROPIUM BROMIDE AND ALBUTEROL SULFATE 3 ML: .5; 3 SOLUTION RESPIRATORY (INHALATION) at 12:03

## 2025-03-17 RX ADMIN — ENOXAPARIN SODIUM 40 MG: 40 INJECTION SUBCUTANEOUS at 05:03

## 2025-03-17 RX ADMIN — IPRATROPIUM BROMIDE AND ALBUTEROL SULFATE 3 ML: .5; 3 SOLUTION RESPIRATORY (INHALATION) at 07:03

## 2025-03-17 RX ADMIN — MUPIROCIN: 20 OINTMENT TOPICAL at 09:03

## 2025-03-17 RX ADMIN — IPRATROPIUM BROMIDE AND ALBUTEROL SULFATE 3 ML: .5; 3 SOLUTION RESPIRATORY (INHALATION) at 04:03

## 2025-03-17 RX ADMIN — ACETYLCYSTEINE 4 ML: 100 INHALANT RESPIRATORY (INHALATION) at 12:03

## 2025-03-17 RX ADMIN — VANCOMYCIN HYDROCHLORIDE 1250 MG: 1.25 INJECTION, POWDER, LYOPHILIZED, FOR SOLUTION INTRAVENOUS at 02:03

## 2025-03-17 NOTE — PLAN OF CARE
Problem: Infection  Goal: Absence of Infection Signs and Symptoms  Outcome: Progressing     Problem: Adult Inpatient Plan of Care  Goal: Plan of Care Review  Outcome: Progressing  Goal: Patient-Specific Goal (Individualized)  Outcome: Progressing  Goal: Absence of Hospital-Acquired Illness or Injury  Outcome: Progressing  Goal: Optimal Comfort and Wellbeing  Outcome: Progressing  Goal: Readiness for Transition of Care  Outcome: Progressing     Problem: Neutropenia  Goal: Absence of Infection  Outcome: Progressing     Problem: Skin Injury Risk Increased  Goal: Skin Health and Integrity  Outcome: Progressing     Problem: Coping Ineffective  Goal: Effective Coping  Outcome: Progressing

## 2025-03-17 NOTE — PLAN OF CARE
03/17/25 1007   Discharge Assessment   Assessment Type Discharge Planning Assessment   Confirmed/corrected address, phone number and insurance Yes   Confirmed Demographics Correct on Facesheet   Source of Information family  (pt's sonDeena and wife, Khadijah)   Communicated CAROL with patient/caregiver Date not available/Unable to determine   Reason For Admission AMS, hypoglycemia, dementia   People in Home spouse  (pt lives with his wifeKhadijah in a single story home with one step to enter the home)   Do you expect to return to your current living situation? Yes   Do you have help at home or someone to help you manage your care at home? Yes   Who are your caregiver(s) and their phone number(s)? pt's wifeKhadijah---[phone]   Prior to hospitilization cognitive status: Unable to Assess   Current cognitive status: Unable to Assess  (pt sleeping)   Walking or Climbing Stairs Difficulty yes   Walking or Climbing Stairs ambulation difficulty, requires equipment   Mobility Management RW, rollator   Dressing/Bathing Difficulty no   Home Layout Able to live on 1st floor   Equipment Currently Used at Home walker, rolling;rollator   Readmission within 30 days? No   Patient currently being followed by outpatient case management? No   Do you currently have service(s) that help you manage your care at home? No   Do you take prescription medications? Yes   Do you have prescription coverage? Yes   Coverage [employer]   Who is going to help you get home at discharge? pt's wifeKhadijah   How do you get to doctors appointments? family or friend will provide   Are you on dialysis? No   Discharge Plan A Hospice/home   Discharge Plan B Hospice/home   DME Needed Upon Discharge  other (see comments)  (TBD)   Discharge Plan discussed with: Adult children;Spouse/sig other   Name(s) and Number(s) pt's wifeKhadijah ([phone]) and pt's sonDeena ([phone])   Transition of Care Barriers None   Housing Stability   In the last 12 months, was  there a time when you were not able to pay the mortgage or rent on time? N   Transportation Needs   Has the lack of transportation kept you from medical appointments, meetings, work or from getting things needed for daily living? No   Food Insecurity   Within the past 12 months, you worried that your food would run out before you got the money to buy more. Never true   OTHER   Name(s) of People in Home wife, Khadijah     Pt[facility] [place]. Pt's  is his wife, Khadijah ([phone]). Pt has never had HH services. [facility].   Pt's family is requesting home with hospice. FOC obtained. Referral given to Timothy with [facility]. CM to follow

## 2025-03-17 NOTE — CONSULTS
Patient Name: Wojciech Jaimes   MRN: [id number]   Admission Date: 3/15/2025   Hospital Length of Stay: 2   Attending Provider: Peterson Darden MD   Consulting Provider: Long Cagle M.D.  Reason for Consult: Goals of Care  Primary Care Physician: No primary care provider on file.     Principal Problem: Encephalopathy, metabolic     Patient information was obtained from relative(s), ER records, and primary team.      Final diagnoses:  [R41.82] AMS (altered mental status)  [R05.9] Cough  [T68.XXXA] Hypothermia, initial encounter  [Z99.81] Supplemental oxygen dependent  [J18.9] Pneumonia of right middle lobe due to infectious organism  [A41.9] Sepsis, due to unspecified organism, unspecified whether acute organ dysfunction present (Primary)  [E16.2] Hypoglycemia       We reviewed the patient's current clinical status with the nurse. We reviewed clinical documentation, labs and imaging.       Advance Care Planning     Date: 03/17/2025    Code Status  In light of the patients advanced and life limiting illness,I engaged the the family in a voluntary conversation about the patient's preferences for care  at the very end of life. The patient wishes to have a natural, peaceful death.  Along those lines, the patient does not wish to have CPR or other invasive treatments performed when his heart and/or breathing stops. I communicated to the family that a DNR order would be placed in his medical record to reflect this preference and LaPOST form was completed to reflect other EOL preferences of the patient such as A. DNR, B. Comfort-focused treatments, C. No artificial nutrition by tube.    A total of 15 min was spent on advance care planning, goals of care discussion, emotional support, formulating and communicating prognosis and exploring burden/benefit of various approaches of treatment. This discussion occurred on a fully voluntary basis with the verbal consent of the patient and/or family.     [facility]  I engaged the family in  a voluntary conversation about advance care planning and we specifically addressed what the goals of care would be moving forward, in light of the patient's change in clinical status, specifically acute hospitalization secondary to bilateral pneumonia with sepsis, hypothermia and shock requiring vasopressor agents.  The patient at baseline has dementia Family history of alcohol abuse.  He also has obstructive sleep apnea and utilizes CPAP.  He has a history of parotid gland cancer.      His legal wife is his home caregiver and surrogate.  His wife states that he has generalized confusion, nighttime agitation and sundowning hours.  He has lost the ability to ambulate and prior to this was walking only with personal assistance.  He also has significant coughing and dysphagia which may have contributed to the pneumonia.  He has no advanced directive or healthcare power of .       I met with the patient's son and wife at bedside.  He was lethargic and unable to open his eyes, follow commands or communicate.  He has respiratory congestion and rhonchi bilaterally with occasional cough.  According to his wife he has no cough reflex at present.  He is NPO.    We did specifically address the patient's likely prognosis, which is poor.  We explored the patient's values and preferences for future care.  The family endorses that what is most important right now is to focus on spending time at home, avoiding the hospital, symptom/pain control, quality of life, even if it means sacrificing a little time, improvement in condition but with limits to invasive therapies, and comfort and QOL     Accordingly, we have decided that the best plan to meet the patient's goals includes no further escalation in treatment and enrolling in hospice care    A total of 20 min was spent on advance care planning, goals of care discussion, emotional support, formulating and communicating prognosis and exploring burden/benefit of various  approaches of treatment. This discussion occurred on a fully voluntary basis with the verbal consent of the patient and/or family.     Hospice  I did explain the role for hospice care at this stage of the patient's illness, including its ability to help the patient live with the best quality of life possible.  We willbe making a hospice referral.         Artifical nutrition:  I reviewed the patient's wishes concerning the option for or against a future placement of a PEG for the purpose of long term artificial nutrition. I reviewed the benefits and risks. At this time the patient's family elects on behalf of the patient against PEG placement in such an event.     Symptom review:    Unable to obtain due to level of consciousness    Assessment and Plan:    Goals of care/counseling  Advanced Alzheimer's dementia  Status post bilateral pneumonia with septic shock  Acute hypoxic respiratory failure  History obstructive sleep apnea    A hospice consult was already placed.  The patient's family is all in agreement with this plan.  I reviewed his current medications and provide recommendations for continuing or discontinuing specific medications.  At present I do not recommend any oral medications secondary to level of consciousness and severe dysphagia.  If the patient does improve and show signs of being able to eat and drink safely in the home, we can evaluate medications for evidence of benefit.  I explained that although he seemed to benefit from medications for Alzheimer's dementia previously, he has likely experienced a transition to a new baseline.  With advanced dementia, there is no data to support benefit from medications such as Namenda and donepezil.  Family stated that they understood.  In addition blood pressure medications would likely not be recommended in a patient who was not eating and drinking significantly.  I would also discontinue statin and vitamins.      History of Present Illness:     74-year-old  "male with a history of dementia, ETOH abuse, obstructive sleep apnea on CPAP, parotid gland cancer currently hospitalized with bilateral pneumonia with sepsis, shock, hypothermia requiring vasopressor agents.  He was also and treated for acute hypoxic respiratory failure number, dehydration and hypoglycemia.  Although he has improved in his from septic shock, he remains obtunded and encephalopathic with severe dysphagia.  His family wished to review goals of care.      Active Ambulatory Problems     Diagnosis Date Noted    No Active Ambulatory Problems     Resolved Ambulatory Problems     Diagnosis Date Noted    No Resolved Ambulatory Problems     No Additional Past Medical History        No past surgical history on file.     Review of patient's allergies indicates:  No Known Allergies     Current Medications[1]       Current Facility-Administered Medications:     albuterol-ipratropium, 3 mL, Nebulization, Q6H PRN    morphine, 1 mg, Intravenous, Q4H PRN    sodium chloride 0.9%, 10 mL, Intravenous, PRN    Pharmacy to dose Vancomycin consult, , , Once **AND** vancomycin - pharmacy to dose, , Intravenous, pharmacy to manage frequency     No family history on file.     Review of Systems   Unable to perform ROS: Mental status change          12 point review of systems conducted, negative except as stated in the history of present illness. See HPI for details.      Objective:   BP (!) 104/54   Pulse 73   Temp 98.1 °F (36.7 °C) (Axillary)   Resp 18   Ht 5' 8" (1.727 m)   Wt 77.1 kg (170 lb)   SpO2 95%   BMI 25.85 kg/m²      Physical Exam  Vitals reviewed.   Constitutional:       Appearance: He is ill-appearing and toxic-appearing.   HENT:      Head: Normocephalic.      Right Ear: External ear normal.      Left Ear: External ear normal.      Nose: Nose normal.   Eyes:      Conjunctiva/sclera: Conjunctivae normal.      Pupils: Pupils are equal, round, and reactive to light.   Cardiovascular:      Rate and Rhythm: " Normal rate and regular rhythm.      Pulses: Normal pulses.      Heart sounds: Normal heart sounds.   Pulmonary:      Effort: Pulmonary effort is normal.      Breath sounds: Rhonchi present.   Abdominal:      General: Abdomen is flat. Bowel sounds are normal. There is no distension.      Palpations: Abdomen is soft.      Tenderness: There is no abdominal tenderness.   Musculoskeletal:      Right lower leg: No edema.      Left lower leg: No edema.   Skin:     General: Skin is warm.   Neurological:      Mental Status: He is disoriented.            Review of Symptoms  Review of Symptoms      Symptom Assessment (ESAS 0-10 Scale)  Unable to complete assessment due to Mental status change     CAM / Delirium:  Positive      Pain Assessment in Advanced Demential Scale (PAINAD)   Breathing - Independent of vocalization:  0  Negative vocalization:  0  Facial expression:  0  Body language:  0  Consolability:  0  Total:  0    Performance Status:  20    Living Arrangements:  Lives with spouse and Lives in home    Psychosocial/Cultural:   See Palliative Psychosocial Note: Yes  **Primary  to Follow**  Palliative Care  Consult: No      Advance Care Planning   Advance Directives:   Living Will: No    LaPOST: Yes    Do Not Resuscitate Status: Yes    Medical Power of : No    Agent's Name:  Wife-Khadijah Jaimes   Agent's Contact Number:  [phone]    Decision Making:  Family answered questions and Patient unable to communicate due to disease severity/cognitive impairment  Goals of Care: The family endorses that what is most important right now is to focus on spending time at home, avoiding the hospital, symptom/pain control, quality of life, even if it means sacrificing a little time, improvement in condition but with limits to invasive therapies, and comfort and QOL     Accordingly, we have decided that the best plan to meet the patient's goals includes enrolling in hospice care            Caregiver burden  formerly assessed: Yes        > 50% of 72 min of encounter was spent in chart review, face to face discussion of goals of care, symptom assessment, coordination of care and emotional support.       Long Cagle M.D.  Palliative Medicine  [facility]         [1]   Current Facility-Administered Medications:     acetylcysteine 100 mg/ml (10%) solution 4 mL, 4 mL, Nebulization, TID WAKE, Gisselle Darden MD, 4 mL at 03/17/25 0734    albuterol-ipratropium 2.5 mg-0.5 mg/3 mL nebulizer solution 3 mL, 3 mL, Nebulization, Q6H PRN, Gisselle Darden MD, 3 mL at 03/17/25 0734    azithromycin (ZITHROMAX) 500 mg in 0.9% NaCl 250 mL IVPB (admixture device), 500 mg, Intravenous, Q24H, Enrico Salazar MD, Stopped at 03/16/25 2324    enoxaparin injection 40 mg, 40 mg, Subcutaneous, Daily, Enrico Salazar MD, 40 mg at 03/16/25 1740    lactated ringers infusion, , Intravenous, Continuous, Enrico Salazar MD, Last Rate: 75 mL/hr at 03/16/25 1727, Rate Verify at 03/16/25 1727    morphine injection 1 mg, 1 mg, Intravenous, Q4H PRN, Peterson Darden MD    mupirocin 2 % ointment, , Nasal, BID, Cassy Corley MD, Given at 03/17/25 0943    piperacillin-tazobactam (ZOSYN) 4.5 g in D5W 100 mL IVPB (MB+), 4.5 g, Intravenous, Q8H, Enrico Salazar MD, Last Rate: 25 mL/hr at 03/17/25 0948, 4.5 g at 03/17/25 0948    scopolamine 1.3-1.5 mg (1 mg over 3 days) 1 patch, 1 patch, Transdermal, Q3 Days, Peterson Darden MD, 1 patch at 03/16/25 2208    sodium chloride 0.9% flush 10 mL, 10 mL, Intravenous, PRN, Enrico Salazar MD    Pharmacy to dose Vancomycin consult, , , Once **AND** vancomycin - pharmacy to dose, , Intravenous, pharmacy to manage frequency, Enrico Salazar MD    vancomycin 1,250 mg in 0.9% NaCl 250 mL IVPB (admixture device), 1,250 mg, Intravenous, Q24H, Cassy Corley MD, Stopped at 03/16/25 9220

## 2025-03-17 NOTE — CARE UPDATE
[phone] Faxed dc clinicals to [facility] at [phone]. Daniela reports pt's dme arrived at 3:29pm. Will take pt out of will call.

## 2025-03-17 NOTE — PLAN OF CARE
03/17/25 1459   Final Note   Assessment Type Discharge Planning Assessment   Anticipated Discharge Disposition [facility] Resources/Appts/Education Provided Appointments scheduled and added to AVS   Post-Acute Status   Post-Acute Authorization Hospice   Hospice Status Set-up Complete/Auth obtained   Discharge Delays None known at this time     Pt will dc home with [facility]

## 2025-03-17 NOTE — DISCHARGE SUMMARY
[facility] Medicine Discharge Summary    Admit Date: 3/15/2025  Discharge Date and Time: 3/17/771196:58 AM  Admitting Physician:  Team  Discharging Physician: Peterson Darden MD.  Primary Care Physician: No primary care provider on file.  Consults: Pulmonary/Intensive care    Discharge Diagnoses:  Encephalopathy metabolic    Hospital Course:   Wojciech Jaimes is a 74 y.o. male who PMH includes dementia,  HTN, ETOH abuse HLD   Tobacco use, tobacco use , Anxiety, Depression; presents to the ED at   ; presents to the ED at [facility] on 3/15/2025 with a primary complaint of AMS and confusion per family reports.  Family noticed patient was in talking and interacting with them at his baseline which they called EMS for ED transport.  Patient's blood glucose was noted to be 64 with EMS which he was given 10 g of glucose with capillary blood glucose 104 on arrival in the ED. patient also had a known cough with wet sounding breath sounds.  Imaging consistent with suspicious of pneumonia.  Patient also was hypotensive which he received IV hydration which provided little relief.  Patient was started on vasopressor therapy with Levophed and admitted to the ICU unit.  Patient was started on broad-spectrum IV antibiotic therapy.  Patient has since been weaned off of Levophed therapy and blood pressures have been marginal which he refused to have Levophed resume.  Patient is a DNR status.  It is reported that they were in the process of setting up hospice services at home prior to admission this hospitalization.    Pt was seen and examined on the day of discharge. He is dnr going to hospice facility.  Vitals:  VITAL SIGNS: 24 HRS MIN & MAX LAST   Temp  Min: 97.6 °F (36.4 °C)  Max: 98.3 °F (36.8 °C) 98.1 °F (36.7 °C)   BP  Min: 92/44  Max: 131/63 (!) 104/54   Pulse  Min: 61  Max: 85  73   Resp  Min: 14  Max: 22 18   SpO2  Min: 94 %  Max: 100 % 95 %       Physical Exam:  Frail cachectic  Aox1  res  diminished  Cvs rrr  Abd soft  Neuro cannot asses  Ext no c/c/e    Procedures Performed: No admission procedures for hospital encounter.     Significant Diagnostic Studies: See Full reports for all details    Recent Labs   Lab 03/15/25  2059   WBC 15.68  15.68*   RBC 4.47*   HGB 14.7   HCT 42.4   MCV 94.9*   MCH 32.9*   MCHC 34.7   RDW 16.6   PLT 79*   MPV 13.4*       Recent Labs   Lab 03/15/25  2059 03/16/25  0050 03/16/25  0409 03/17/25  0615     --  139 137   K 4.6  --  4.3 4.6     --  110* 110*   CO2 26  --  21* 21*   BUN 25.2  --  22.8 20.3   CREATININE 1.56*  --  1.51* 1.49*   CALCIUM 10.1*  --  9.6 9.2   PH  --  7.400  --   --    MG 2.00  --  1.70 1.80   ALBUMIN 2.9*  --  2.4* 2.1*   ALKPHOS 147  --  121 122   ALT 52  --  42 39   AST 36*  --  27 38*   BILITOT 0.4  --  <0.5 0.8        Microbiology Results (last 7 days)       Procedure Component Value Units Date/Time    Blood culture #2 **CANNOT BE ORDERED STAT** [[id number]]  (Abnormal) Collected: 03/15/25 2113    Order Status: Completed Specimen: Blood from Hand, Left Updated: 03/17/25 0705     GRAM STAIN Gram Positive Cocci, probable Staphylococcus      Seen in gram stain of broth only      1 of 2 Aerobic bottles positive    Respiratory Culture [[id number]]  (Abnormal) Collected: 03/15/25 2344    Order Status: Completed Specimen: Sputum, Expectorated Updated: 03/17/25 0649     Respiratory Culture Moderate Gram-negative Rods     Comment: with normal respiratory lizzie        GRAM STAIN Quality 2+      Moderate Gram positive cocci      Few Gram Negative Rods    Blood culture #1 **CANNOT BE ORDERED STAT** [[id number]]  (Normal) Collected: 03/16/25 0043    Order Status: Completed Specimen: Blood from Arm, Left Updated: 03/17/25 0300     Blood Culture No Growth At 24 Hours    BCID2 Panel [[id number]]  (Abnormal) Collected: 03/15/25 2113    Order Status: Completed Specimen: Blood from Hand, Left Updated: 03/17/25 5100     CTX-M (ESBL ) N/A      IMP (Cabapenemase ) N/A     KPC resistance gene (Carbapenemase ) N/A     mcr-1 N/A     mecA ID N/A     Comment: Note: Antimicrobial resistance can occur via multiple mechanisms. A Not Detected result for antimicrobial resistance gene(s) does not indicate antimicrobial susceptibility. Subculturing is required for species identification and susceptibility testing of   isolates.        mecA/C and MREJ (MRSA) gene N/A     NDM (Carbapenemase ) N/A     OXA-48-like (Carbapenemase ) N/A     Seble/B (VRE gene) N/A     VIM (Carbapenemase ) N/A     Enterococcus faecalis Not Detected     Enterococcus faecium Not Detected     Listeria monocytogenes Not Detected     Staphylococcus spp. Detected     Staphylococcus aureus Not Detected     Staphylococcus epidermidis Not Detected     Staphylococcus lugdunensis Not Detected     Streptococcus spp. Not Detected     Streptococcus agalactiae (Group B) Not Detected     Streptococcus pneumoniae Not Detected     Streptococcus pyogenes (Group A) Not Detected     Acinetobacter calcoaceticus/baumannii complex Not Detected     Bacteroides fragilis Not Detected     Enterobacterales Not Detected     Enterobacter cloacae complex Not Detected     Escherichia coli Not Detected     Klebsiella aerogenes Not Detected     Klebsiella oxytoca Not Detected     Klebsiella pneumoniae group Not Detected     Proteus spp. Not Detected     Salmonella spp. Not Detected     Serratia marcescens Not Detected     Haemophilus influenzae Not Detected     Neisseria meningitidis Not Detected     Pseudomonas aeruginosa Not Detected     Stenotrophomonas maltophilia Not Detected     Candida albicans Not Detected     Candida auris Not Detected     Candida glabrata Not Detected     Candida krusei Not Detected     Candida parapsilosis Not Detected     Candida tropicalis Not Detected     Cryptococcus neoformans/gattii Not Detected    Narrative:      The [employer] BCID2 Panel is a multiplexed  nucleic acid test intended for the use with [employer]® FilmArray® 2.0 or [employer]® [employer]® SynapSense Systems for the simultaneous qualitative detection and identification of multiple bacterial and yeast nucleic acids and select genetic determinants associated with antimicrobial resistance.  The [employer] BCID2 Panel test is performed directly on blood culture samples identified as positive by a continuous monitoring blood culture system.  Results are intended to be interpreted in conjunction with Gram stain results.    Respiratory Culture [[id number]]     Order Status: Sent Specimen: Sputum              X-Ray Chest AP Portable  Narrative: EXAMINATION:  XR CHEST AP PORTABLE    CLINICAL HISTORY:  Cough, unspecified    COMPARISON:  No priors    FINDINGS:  Frontal view of the chest was obtained. Heart is not enlarged.  Mild interstitial prominence.  No dense consolidation or pneumothorax.  Impression: Mild interstitial prominence, question pulmonary vascular congestion or infection.    Electronically signed by: Rome Nunez  Date:    03/16/2025  Time:    11:36  CT Chest Without Contrast  Narrative: EXAMINATION:  CT CHEST WITHOUT CONTRAST    CLINICAL HISTORY:  Sepsis.Pneumonia, unresolved;Aspiration;    TECHNIQUE:  Helical acquisition through the chest performed without IV contrast. Three plane reconstructions made available for review.  mGycm. Automatic exposure control, adjustment of mA/kV or iterative reconstruction technique was used to reduce radiation.    COMPARISON:  No priors    FINDINGS:  No significantly enlarged thoracic lymph nodes.    Heart is not enlarged.  There are coronary artery calcifications.    No pleural effusion.  There are patchy opacities in both lungs favoring the lower lobes.  There is bronchial wall thickening.    No acute findings imaged upper abdomen.  No acute osseous findings or  Impression: Patchy bilateral opacities favoring the lower lobes likely infectious or inflammatory.  Recommend  follow-up to resolution.    No significant discrepancy with the preliminary report.    Electronically signed by: Rome Nunez  Date:    03/16/2025  Time:    10:34  CT Head Without Contrast  Narrative: EXAMINATION:  CT HEAD WITHOUT CONTRAST    CLINICAL HISTORY:  Mental status change, unknown cause;    TECHNIQUE:  Axial scans were obtained from skull base to the vertex.    Coronal and sagittal reconstructions obtained from the axial data.    Automatic exposure control was utilized to limit radiation dose.    Contrast: None    Radiation Dose:    Total DLP: 1082 mGy*cm    COMPARISON:  None    FINDINGS:  There is no acute intracranial hemorrhage or edema. The gray-white matter differentiation is preserved.  Patchy hypodensities in the subcortical periventricular white matter likely represent chronic microvascular ischemic changes.    There is no mass effect or midline shift.  There is diffuse parenchymal volume loss.  The basal cisterns are patent. There is no abnormal extra-axial fluid collection.  Carotid artery calcifications are noted.    The calvarium and skull base are intact.  There is partial opacification of the paranasal sinuses.  Impression: 1. No acute intracranial abnormality.  2. Chronic microvascular ischemic changes  No significant change from the [facility] interpretation.    Electronically signed by: Donita Cagle  Date:    03/16/2025  Time:    08:42         Medication List        CONTINUE taking these medications      amLODIPine 10 MG tablet  Commonly known as: NORVASC     cloNIDine 0.1 mg/24 hr td ptwk 0.1 mg/24 hr  Commonly known as: CATAPRES     donepeziL 10 MG tablet  Commonly known as: ARICEPT     EScitalopram oxalate 10 MG tablet  Commonly known as: LEXAPRO     memantine 10 MG Tab  Commonly known as: NAMENDA     pravastatin 80 MG tablet  Commonly known as: PRAVACHOL     VITAMIN B-1 100 MG tablet  Generic drug: thiamine     zolpidem 10 mg Tab  Commonly known as: AMBIEN               Explained in  detail to the patient about the discharge plan, medications, and follow-up visits. Pt understands and agrees with the treatment plan  Discharge Disposition:     Discharged Condition: stable  Diet-   Dietary Orders (From admission, onward)       Start     Ordered    03/15/25 2327  Diet NPO  (Diet/Nutrition - [facility])  Diet effective now         03/15/25 2332                   Medications Per DC med rec  Activities as tolerated    For further questions contact hospitalist office    Discharge time 33 minutes    For worsening symptoms, chest pain, shortness of breath, increased abdominal pain, high grade fever, stroke or stroke like symptoms, immediately go to the nearest Emergency Room or call 911 as soon as possible.      Peterson French M.D, on 3/17/2025. at 11:58 AM.

## 2025-03-18 LAB
BACTERIA SPEC CULT: ABNORMAL
GRAM STN SPEC: ABNORMAL

## 2025-03-18 NOTE — NURSING
Paperwork provided to EMS. Family aware of discharge,currently at bedside at time of discharge. Hospice company notified.

## 2025-03-19 LAB
BACTERIA BLD CULT: ABNORMAL
GRAM STN SPEC: ABNORMAL
OHS QRS DURATION: 90 MS
OHS QTC CALCULATION: 456 MS

## 2025-03-21 LAB — BACTERIA BLD CULT: NORMAL
